# Patient Record
Sex: MALE | Race: WHITE | NOT HISPANIC OR LATINO
[De-identification: names, ages, dates, MRNs, and addresses within clinical notes are randomized per-mention and may not be internally consistent; named-entity substitution may affect disease eponyms.]

---

## 2018-01-11 ENCOUNTER — TRANSCRIPTION ENCOUNTER (OUTPATIENT)
Age: 58
End: 2018-01-11

## 2018-01-11 ENCOUNTER — APPOINTMENT (OUTPATIENT)
Dept: SURGERY | Facility: CLINIC | Age: 58
End: 2018-01-11

## 2018-05-30 ENCOUNTER — APPOINTMENT (OUTPATIENT)
Dept: NEUROSURGERY | Facility: CLINIC | Age: 58
End: 2018-05-30

## 2018-06-26 ENCOUNTER — APPOINTMENT (OUTPATIENT)
Dept: NEUROLOGY | Facility: CLINIC | Age: 58
End: 2018-06-26

## 2018-09-05 ENCOUNTER — APPOINTMENT (OUTPATIENT)
Dept: NEUROSURGERY | Facility: CLINIC | Age: 58
End: 2018-09-05
Payer: COMMERCIAL

## 2018-09-05 VITALS
DIASTOLIC BLOOD PRESSURE: 79 MMHG | HEART RATE: 60 BPM | SYSTOLIC BLOOD PRESSURE: 115 MMHG | BODY MASS INDEX: 35.12 KG/M2 | WEIGHT: 265 LBS | HEIGHT: 73 IN | RESPIRATION RATE: 18 BRPM

## 2018-09-05 PROCEDURE — 99203 OFFICE O/P NEW LOW 30 MIN: CPT

## 2018-10-24 ENCOUNTER — APPOINTMENT (OUTPATIENT)
Dept: NEUROLOGY | Facility: CLINIC | Age: 58
End: 2018-10-24

## 2018-11-21 ENCOUNTER — APPOINTMENT (OUTPATIENT)
Dept: NEUROSURGERY | Facility: CLINIC | Age: 58
End: 2018-11-21
Payer: COMMERCIAL

## 2018-11-21 VITALS
DIASTOLIC BLOOD PRESSURE: 86 MMHG | HEART RATE: 80 BPM | BODY MASS INDEX: 36.05 KG/M2 | RESPIRATION RATE: 17 BRPM | SYSTOLIC BLOOD PRESSURE: 145 MMHG | WEIGHT: 272 LBS | HEIGHT: 73 IN

## 2018-11-21 PROCEDURE — 99213 OFFICE O/P EST LOW 20 MIN: CPT

## 2019-02-11 ENCOUNTER — APPOINTMENT (OUTPATIENT)
Dept: NEUROSURGERY | Facility: CLINIC | Age: 59
End: 2019-02-11

## 2019-03-29 ENCOUNTER — TRANSCRIPTION ENCOUNTER (OUTPATIENT)
Age: 59
End: 2019-03-29

## 2019-04-08 ENCOUNTER — APPOINTMENT (OUTPATIENT)
Dept: CARDIOLOGY | Facility: CLINIC | Age: 59
End: 2019-04-08
Payer: COMMERCIAL

## 2019-04-08 ENCOUNTER — NON-APPOINTMENT (OUTPATIENT)
Age: 59
End: 2019-04-08

## 2019-04-08 VITALS
BODY MASS INDEX: 35.78 KG/M2 | WEIGHT: 270 LBS | OXYGEN SATURATION: 98 % | SYSTOLIC BLOOD PRESSURE: 142 MMHG | DIASTOLIC BLOOD PRESSURE: 84 MMHG | HEART RATE: 55 BPM | HEIGHT: 73 IN

## 2019-04-08 PROCEDURE — 93000 ELECTROCARDIOGRAM COMPLETE: CPT

## 2019-04-08 PROCEDURE — 99205 OFFICE O/P NEW HI 60 MIN: CPT

## 2019-04-08 NOTE — DISCUSSION/SUMMARY
[FreeTextEntry1] : In summary the patient is a 58-year-old gentleman with hypertension, obesity, obstructive sleep apnea and increasing episodes of paroxysmal atrial fibrillation. We discussed the underlying pathophysiology of atrial fibrillation at length. In addition we reviewed the potential treatment options of rate control and anticoagulation, rhythm control utilizing an antiarrhythmic or rhythm control utilizing ablation. We discussed the pros and cons of each approach. In addition we discussed the procedures, risks and outcomes of atrial fibrillation ablation. For now the patient is going to start on flecainide 100 mg twice a day in addition to his atenolol. He is considering ablation and I think he is leaning in this direction. In addition the patient does have a risk worth one for his hypertension. We discussed the pros and cons of anticoagulation. Given the fact that the risk of bleeding on aspirin in somebody like the pain is similar in trials to the risk of bleeding on Eliquis, while aspirin offers no protection from stroke in atrial fibrillation I think anticoagulation is reasonable. The patient does have Eliquis at home and is going to start taking 5 mg twice a day.

## 2019-04-08 NOTE — HISTORY OF PRESENT ILLNESS
[FreeTextEntry1] : I had the pleasure of seeing your patient Curtis Tatum today in the arrhythmia clinic of [facility]. As you well no the patient is a delightful 58-year-old gentleman with a history of hypertension, hyperlipidemia, sleep apnea, obesity and atrial fibrillation. Over the years the patient reported vague episodes of chest pain and occasional dizziness. In addition he had palpitations. He thinks the atrial fibrillation probably started around 2014. He underwent an evaluation at that time and he was under quite a bit of stress. Things settled down and he went for about 2 years without any episodes but they seem to have increased again. Workup in the past included an event recorder without arrhythmia. He was started on low dose atenolol which seemed to reduce his palpitations. The patient had lost weight and in 2014 had come off his medications. He had an acute rise in his blood pressure and what he described as a panic attack with his heart rate going to 140 beats per minute. He went back on atenolol and this terminated the episode acutely. Ultimately he was diagnosed with atrial fibrillation and has been managed with aspirin and beta blocker only. The patient had a stress test and echocardiogram which were unremarkable. He does report he was having increasing episodes of the atrial fibrillation and was on serial increases of the atenolol. He had been prescribed 25 mg twice a day but ultimately on his own was taking 50 in the morning 25 in the middle of the day and 50 at night. He wore a monitor from March 5 through the 13th 2019 which showed a heart rate between 34 and 143 beats per minute with an average of 52. He was in atrial fibrillation 4% of the time with the longest episode being 2 hours and 43 minutes and heart rates ranging from  beats per minute. He also had runs of what appeared to be an atrial tachycardia. He was advised to decrease the atenolol to 50 in the morning and 25 in the evening. \par \par Echocardiogram February 13, 2019: Normal left ventricular size and function with ejection fraction of 60-65%. Left atrial enlargement of 4.7 cm. No significant valvular disease\par \par Chemical stress test March 26th 2019 normal left ventricular size and function with an ejection fraction of 60% and no evidence of ischemia\par \par Today in clinic the patient reports doing overall well. He continues to have breakthrough episodes that are symptomatic. His blood pressure is 142/84 and his pulse was 55 and regular. His EKG demonstrated sinus bradycardia 54 beats per minute.\par

## 2019-04-18 ENCOUNTER — MEDICATION RENEWAL (OUTPATIENT)
Age: 59
End: 2019-04-18

## 2019-04-19 ENCOUNTER — MEDICATION RENEWAL (OUTPATIENT)
Age: 59
End: 2019-04-19

## 2019-04-22 ENCOUNTER — APPOINTMENT (OUTPATIENT)
Dept: ELECTROPHYSIOLOGY | Facility: CLINIC | Age: 59
End: 2019-04-22
Payer: COMMERCIAL

## 2019-04-22 ENCOUNTER — NON-APPOINTMENT (OUTPATIENT)
Age: 59
End: 2019-04-22

## 2019-04-22 VITALS
HEIGHT: 73 IN | DIASTOLIC BLOOD PRESSURE: 78 MMHG | SYSTOLIC BLOOD PRESSURE: 121 MMHG | HEART RATE: 54 BPM | WEIGHT: 270 LBS | BODY MASS INDEX: 35.78 KG/M2 | OXYGEN SATURATION: 98 %

## 2019-04-22 PROCEDURE — 93000 ELECTROCARDIOGRAM COMPLETE: CPT

## 2019-04-22 PROCEDURE — 99214 OFFICE O/P EST MOD 30 MIN: CPT

## 2019-04-22 RX ORDER — FLECAINIDE ACETATE 150 MG/1
150 TABLET ORAL
Qty: 180 | Refills: 1 | Status: DISCONTINUED | COMMUNITY
Start: 2019-04-08 | End: 2019-04-22

## 2019-04-22 RX ORDER — PAROXETINE 25 MG/1
25 TABLET, FILM COATED, EXTENDED RELEASE ORAL DAILY
Refills: 0 | Status: ACTIVE | COMMUNITY
Start: 2019-03-02

## 2019-04-26 ENCOUNTER — MEDICATION RENEWAL (OUTPATIENT)
Age: 59
End: 2019-04-26

## 2019-04-26 NOTE — REASON FOR VISIT
[Atrial Fibrillation] : atrial fibrillation [Follow-Up - Clinic] : a clinic follow-up of [Spouse] : spouse

## 2019-04-26 NOTE — HISTORY OF PRESENT ILLNESS
[FreeTextEntry1] : I had the pleasure of seeing your patient Curtis Tatum today in the arrhythmia clinic of [facility]. As you well no the patient is a delightful 58-year-old gentleman with a history of hypertension, hyperlipidemia, sleep apnea, obesity and atrial fibrillation. Over the years the patient reported vague episodes of chest pain and occasional dizziness. In addition he had palpitations. He thinks the atrial fibrillation probably started around 2014. He underwent an evaluation at that time and he was under quite a bit of stress. Things settled down and he went for about 2 years without any episodes but they seem to have increased again. Workup in the past included an event recorder without arrhythmia. He was started on low dose atenolol which seemed to reduce his palpitations. The patient had lost weight and in 2014 had come off his medications. He had an acute rise in his blood pressure and what he described as a panic attack with his heart rate going to 140 beats per minute. He went back on atenolol and this terminated the episode acutely. Ultimately he was diagnosed with atrial fibrillation and has been managed with aspirin and beta blocker only. The patient had a stress test and echocardiogram which were unremarkable. He does report he was having increasing episodes of the atrial fibrillation and was on serial increases of the atenolol. He had been prescribed 25 mg twice a day but ultimately on his own was taking 50 in the morning 25 in the middle of the day and 50 at night. He wore a monitor from March 5 through the 13th 2019 which showed a heart rate between 34 and 143 beats per minute with an average of 52. He was in atrial fibrillation 4% of the time with the longest episode being 2 hours and 43 minutes and heart rates ranging from  beats per minute. He also had runs of what appeared to be an atrial tachycardia. He was advised to decrease the atenolol to 50 in the morning and 25 in the evening. \par \par Echocardiogram February 13, 2019: Normal left ventricular size and function with ejection fraction of 60-65%. Left atrial enlargement of 4.7 cm. No significant valvular disease\par \par Chemical stress test March 26th 2019 normal left ventricular size and function with an ejection fraction of 60% and no evidence of ischemia\par \par When I last saw him in clinic we discussed the treatment options. We discussed trying flecainide versus catheter ablation. The patient opted for medical approach but did not take the medications initially. He'll do that when he started taking them and there was some initial success but he began having breakthroughs. He therefore increased the dose from 100 mg twice a day to 150 mg twice a day but was still having breakthrough episodes. He comes in to clinic today to again discuss options.\par \par Today in clinic the patient reports having atrial fibrillation on and off almost every day. The flecainide initially seemed to help and was potentially wearing off but he has now had episodes even soon after a dose. We discussed how this is probably not working at this point. His blood pressure today was 121/78 his pulse 54 and regular. His EKG revealed sinus bradycardia 54 beats per minute.\par

## 2019-04-26 NOTE — DISCUSSION/SUMMARY
[FreeTextEntry1] : In summary the patient is a 58-year-old gentleman with symptomatic paroxysmal atrial fibrillation and an anxiety disorder. We again discussed the options and for now the patient wants to take flecainide on a p.r.n. basis however I advised him that given his frequency of atrial fibrillation this is not going to be ultimately a long-term option. We discussed alternative antiarrhythmic medications. We are somewhat limited due to his current antidepressants and antianxiety use which would interact with drugs with significantly prolonged QT such as sotalol or dofetilide. We could try amiodarone. We again discussed ablation and he is going to consider this moving forward.

## 2019-05-13 ENCOUNTER — APPOINTMENT (OUTPATIENT)
Dept: ELECTROPHYSIOLOGY | Facility: CLINIC | Age: 59
End: 2019-05-13
Payer: COMMERCIAL

## 2019-05-13 ENCOUNTER — NON-APPOINTMENT (OUTPATIENT)
Age: 59
End: 2019-05-13

## 2019-05-13 VITALS
WEIGHT: 270 LBS | HEART RATE: 50 BPM | DIASTOLIC BLOOD PRESSURE: 79 MMHG | BODY MASS INDEX: 35.78 KG/M2 | SYSTOLIC BLOOD PRESSURE: 114 MMHG | HEIGHT: 73 IN | OXYGEN SATURATION: 98 %

## 2019-05-13 PROCEDURE — 93000 ELECTROCARDIOGRAM COMPLETE: CPT

## 2019-05-13 PROCEDURE — 99214 OFFICE O/P EST MOD 30 MIN: CPT

## 2019-05-16 NOTE — DISCUSSION/SUMMARY
[FreeTextEntry1] : In summary the patient is an anxious 58-year-old gentleman with symptomatic paroxysmal atrial fibrillation and an anxiety disorder. We again discussed the options and he will decrease the amiodarone to 200mg and plan for an ablation in June.

## 2019-05-16 NOTE — HISTORY OF PRESENT ILLNESS
[FreeTextEntry1] : I had the pleasure of seeing your patient Curtis Tatum today in the arrhythmia clinic of [facility]. As you well no the patient is a delightful but anxious 58-year-old gentleman with a history of hypertension, hyperlipidemia, sleep apnea, obesity and atrial fibrillation. Over the years the patient reported vague episodes of chest pain and occasional dizziness. In addition he had palpitations. He thinks the atrial fibrillation probably started around 2014. He underwent an evaluation at that time and he was under quite a bit of stress. Things settled down and he went for about 2 years without any episodes but they seem to have increased again. Workup in the past included an event recorder without arrhythmia. He was started on low dose atenolol which seemed to reduce his palpitations. The patient had lost weight and in 2014 had come off his medications. He had an acute rise in his blood pressure and what he described as a panic attack with his heart rate going to 140 beats per minute. He went back on atenolol and this terminated the episode acutely. Ultimately he was diagnosed with atrial fibrillation and has been managed with aspirin and beta blocker only. The patient had a stress test and echocardiogram which were unremarkable. He does report he was having increasing episodes of the atrial fibrillation and was on serial increases of the atenolol. He had been prescribed 25 mg twice a day but ultimately on his own was taking 50 in the morning 25 in the middle of the day and 50 at night. He wore a monitor from March 5 through the 13th 2019 which showed a heart rate between 34 and 143 beats per minute with an average of 52. He was in atrial fibrillation 4% of the time with the longest episode being 2 hours and 43 minutes and heart rates ranging from  beats per minute. He also had runs of what appeared to be an atrial tachycardia. He was advised to decrease the atenolol to 50 in the morning and 25 in the evening. \par \par Echocardiogram February 13, 2019: Normal left ventricular size and function with ejection fraction of 60-65%. Left atrial enlargement of 4.7 cm. No significant valvular disease\par \par A chemical stress test March 26th 2019 normal left ventricular size and function with an ejection fraction of 60% and no evidence of ischemia\par \par When I first saw him in clinic we discussed the treatment options. We discussed trying flecainide versus catheter ablation. The patient opted for medical approach but did not take the medications initially. He'll do that when he started taking them and there was some initial success but he began having breakthroughs. He therefore increased the dose from 100 mg twice a day to 150 mg twice a day but was still having breakthrough episodes. We switched him to amiodarone to try to settle things down until he has the ablation.  He comes in to clinic today to again discuss how he is doing.\par \par The patient acknowledges that he is quite anxious. He thinks he is having side effects again and complains of some shortness of breath stomach pain and dizziness. I advised him that he could back off on the amiodarone to 200 mg a day at this point. His blood pressure today in clinic was 114/79 his pulse was 50 and regular. His EKG revealed sinus rhythm at 50 beats per minute with low voltage.\par \par \par

## 2019-05-29 ENCOUNTER — APPOINTMENT (OUTPATIENT)
Dept: CV DIAGNOSITCS | Facility: HOSPITAL | Age: 59
End: 2019-05-29

## 2019-06-04 ENCOUNTER — APPOINTMENT (OUTPATIENT)
Dept: CV DIAGNOSITCS | Facility: HOSPITAL | Age: 59
End: 2019-06-04

## 2019-06-05 ENCOUNTER — APPOINTMENT (OUTPATIENT)
Dept: CV DIAGNOSITCS | Facility: HOSPITAL | Age: 59
End: 2019-06-05

## 2019-06-18 ENCOUNTER — MEDICATION RENEWAL (OUTPATIENT)
Age: 59
End: 2019-06-18

## 2019-06-20 ENCOUNTER — MEDICATION RENEWAL (OUTPATIENT)
Age: 59
End: 2019-06-20

## 2019-06-20 RX ORDER — AMIODARONE HYDROCHLORIDE 200 MG/1
200 TABLET ORAL
Qty: 137 | Refills: 0 | Status: DISCONTINUED | COMMUNITY
Start: 2019-04-26 | End: 2019-06-20

## 2019-06-21 ENCOUNTER — RX RENEWAL (OUTPATIENT)
Age: 59
End: 2019-06-21

## 2019-07-01 ENCOUNTER — APPOINTMENT (OUTPATIENT)
Dept: CARDIOLOGY | Facility: CLINIC | Age: 59
End: 2019-07-01
Payer: COMMERCIAL

## 2019-07-01 ENCOUNTER — NON-APPOINTMENT (OUTPATIENT)
Age: 59
End: 2019-07-01

## 2019-07-01 VITALS
WEIGHT: 270 LBS | OXYGEN SATURATION: 97 % | SYSTOLIC BLOOD PRESSURE: 123 MMHG | HEART RATE: 49 BPM | BODY MASS INDEX: 35.78 KG/M2 | HEIGHT: 73 IN | DIASTOLIC BLOOD PRESSURE: 85 MMHG

## 2019-07-01 PROCEDURE — 93000 ELECTROCARDIOGRAM COMPLETE: CPT

## 2019-07-01 PROCEDURE — 99213 OFFICE O/P EST LOW 20 MIN: CPT

## 2019-07-05 NOTE — DISCUSSION/SUMMARY
[FreeTextEntry1] : In summary the patient is an anxious 58-year-old gentleman with symptomatic paroxysmal atrial fibrillation despite antiarrhythmic therapy. The patient was coming in to again review what to expect with his ablation. We discussed the procedure, outcomes and risks at length the again. The patient is comfortable and he is scheduled for this procedure in the next couple of weeks.

## 2019-07-05 NOTE — HISTORY OF PRESENT ILLNESS
[FreeTextEntry1] : I had the pleasure of seeing your patient Curtis Tatum today in the arrhythmia clinic of [facility]. As you well no the patient is a delightful but anxious 58-year-old gentleman with a history of hypertension, hyperlipidemia, sleep apnea, obesity and atrial fibrillation. Over the years the patient reported vague episodes of chest pain and occasional dizziness. In addition he had palpitations. He thinks the atrial fibrillation probably started around 2014. He underwent an evaluation at that time and he was under quite a bit of stress. Things settled down and he went for about 2 years without any episodes but they seem to have increased again. Workup in the past included an event recorder without arrhythmia. He was started on low dose atenolol which seemed to reduce his palpitations. The patient had lost weight and in 2014 had come off his medications. He had an acute rise in his blood pressure and what he described as a panic attack with his heart rate going to 140 beats per minute. He went back on atenolol and this terminated the episode acutely. Ultimately he was diagnosed with atrial fibrillation and has been managed with aspirin and beta blocker only. The patient had a stress test and echocardiogram which were unremarkable. He does report he was having increasing episodes of the atrial fibrillation and was on serial increases of the atenolol. He had been prescribed 25 mg twice a day but ultimately on his own was taking 50 in the morning 25 in the middle of the day and 50 at night. He wore a monitor from March 5 through the 13th 2019 which showed a heart rate between 34 and 143 beats per minute with an average of 52. He was in atrial fibrillation 4% of the time with the longest episode being 2 hours and 43 minutes and heart rates ranging from  beats per minute. He also had runs of what appeared to be an atrial tachycardia. He was advised to decrease the atenolol to 50 in the morning and 25 in the evening. \par \par Echocardiogram February 13, 2019: Normal left ventricular size and function with ejection fraction of 60-65%. Left atrial enlargement of 4.7 cm. No significant valvular disease\par \par A chemical stress test March 26th 2019 normal left ventricular size and function with an ejection fraction of 60% and no evidence of ischemia\par \par When I first saw him in clinic we discussed the treatment options. We discussed trying flecainide versus catheter ablation. The patient opted for medical approach but did not take the medications initially. He'll do that when he started taking them and there was some initial success but he began having breakthroughs. He therefore increased the dose from 100 mg twice a day to 150 mg twice a day but was still having breakthrough episodes. We switched him to amiodarone to try to settle things down until he has the ablation. \par \par The patient did not feel that the amiodarone was helping and switched himself back to flecainide. He is currently taking 50 mg twice a day, he is on anticoagulation and takes anywhere from 25-37.5 mg of atenolol a day. The patient has opted for an ablative approach but wanted to again come in and discussed his decision given his underlying anxiety.\par \par Today in clinic the patient is doing fairly well. He has had decreased episodes on his current regimen but still has breakthroughs. His blood pressure was 123/85 and his pulse was 49 and regular. His EKG revealed sinus rhythm at 50 beats per minute and was otherwise normal.

## 2019-07-09 ENCOUNTER — APPOINTMENT (OUTPATIENT)
Dept: CV DIAGNOSITCS | Facility: HOSPITAL | Age: 59
End: 2019-07-09

## 2019-07-17 ENCOUNTER — OUTPATIENT (OUTPATIENT)
Dept: OUTPATIENT SERVICES | Facility: HOSPITAL | Age: 59
LOS: 1 days | End: 2019-07-17
Payer: COMMERCIAL

## 2019-07-17 VITALS
HEIGHT: 73 IN | OXYGEN SATURATION: 97 % | RESPIRATION RATE: 16 BRPM | WEIGHT: 276.9 LBS | DIASTOLIC BLOOD PRESSURE: 77 MMHG | SYSTOLIC BLOOD PRESSURE: 125 MMHG | TEMPERATURE: 99 F | HEART RATE: 49 BPM

## 2019-07-17 VITALS
WEIGHT: 276.9 LBS | OXYGEN SATURATION: 97 % | HEIGHT: 73 IN | TEMPERATURE: 99 F | HEART RATE: 49 BPM | SYSTOLIC BLOOD PRESSURE: 125 MMHG | DIASTOLIC BLOOD PRESSURE: 77 MMHG | RESPIRATION RATE: 16 BRPM

## 2019-07-17 DIAGNOSIS — Z01.818 ENCOUNTER FOR OTHER PREPROCEDURAL EXAMINATION: ICD-10-CM

## 2019-07-17 DIAGNOSIS — I48.91 UNSPECIFIED ATRIAL FIBRILLATION: ICD-10-CM

## 2019-07-17 LAB
ALBUMIN SERPL ELPH-MCNC: 4.3 G/DL — SIGNIFICANT CHANGE UP (ref 3.3–5)
ALP SERPL-CCNC: 51 U/L — SIGNIFICANT CHANGE UP (ref 40–120)
ALT FLD-CCNC: 16 U/L — SIGNIFICANT CHANGE UP (ref 10–45)
ANION GAP SERPL CALC-SCNC: 11 MMOL/L — SIGNIFICANT CHANGE UP (ref 5–17)
APTT BLD: 34.8 SEC — SIGNIFICANT CHANGE UP (ref 27.5–36.3)
AST SERPL-CCNC: 20 U/L — SIGNIFICANT CHANGE UP (ref 10–40)
BILIRUB SERPL-MCNC: 0.5 MG/DL — SIGNIFICANT CHANGE UP (ref 0.2–1.2)
BLD GP AB SCN SERPL QL: NEGATIVE — SIGNIFICANT CHANGE UP
BUN SERPL-MCNC: 17 MG/DL — SIGNIFICANT CHANGE UP (ref 7–23)
CALCIUM SERPL-MCNC: 9.1 MG/DL — SIGNIFICANT CHANGE UP (ref 8.4–10.5)
CHLORIDE SERPL-SCNC: 100 MMOL/L — SIGNIFICANT CHANGE UP (ref 96–108)
CO2 SERPL-SCNC: 27 MMOL/L — SIGNIFICANT CHANGE UP (ref 22–31)
CREAT SERPL-MCNC: 0.9 MG/DL — SIGNIFICANT CHANGE UP (ref 0.5–1.3)
GLUCOSE SERPL-MCNC: 105 MG/DL — HIGH (ref 70–99)
HCT VFR BLD CALC: 36.7 % — LOW (ref 39–50)
HGB BLD-MCNC: 12.6 G/DL — LOW (ref 13–17)
INR BLD: 1.01 RATIO — SIGNIFICANT CHANGE UP (ref 0.88–1.16)
MCHC RBC-ENTMCNC: 30.4 PG — SIGNIFICANT CHANGE UP (ref 27–34)
MCHC RBC-ENTMCNC: 34.5 GM/DL — SIGNIFICANT CHANGE UP (ref 32–36)
MCV RBC AUTO: 88 FL — SIGNIFICANT CHANGE UP (ref 80–100)
PLATELET # BLD AUTO: 194 K/UL — SIGNIFICANT CHANGE UP (ref 150–400)
POTASSIUM SERPL-MCNC: 4.5 MMOL/L — SIGNIFICANT CHANGE UP (ref 3.5–5.3)
POTASSIUM SERPL-SCNC: 4.5 MMOL/L — SIGNIFICANT CHANGE UP (ref 3.5–5.3)
PROT SERPL-MCNC: 6.8 G/DL — SIGNIFICANT CHANGE UP (ref 6–8.3)
PROTHROM AB SERPL-ACNC: 11.5 SEC — SIGNIFICANT CHANGE UP (ref 10–12.9)
RBC # BLD: 4.17 M/UL — LOW (ref 4.2–5.8)
RBC # FLD: 12.5 % — SIGNIFICANT CHANGE UP (ref 10.3–14.5)
RH IG SCN BLD-IMP: POSITIVE — SIGNIFICANT CHANGE UP
SODIUM SERPL-SCNC: 138 MMOL/L — SIGNIFICANT CHANGE UP (ref 135–145)
WBC # BLD: 8 K/UL — SIGNIFICANT CHANGE UP (ref 3.8–10.5)
WBC # FLD AUTO: 8 K/UL — SIGNIFICANT CHANGE UP (ref 3.8–10.5)

## 2019-07-17 PROCEDURE — 93005 ELECTROCARDIOGRAM TRACING: CPT

## 2019-07-17 PROCEDURE — G0463: CPT

## 2019-07-17 PROCEDURE — 85027 COMPLETE CBC AUTOMATED: CPT

## 2019-07-17 PROCEDURE — 86850 RBC ANTIBODY SCREEN: CPT

## 2019-07-17 PROCEDURE — 80053 COMPREHEN METABOLIC PANEL: CPT

## 2019-07-17 PROCEDURE — 85730 THROMBOPLASTIN TIME PARTIAL: CPT

## 2019-07-17 PROCEDURE — 86900 BLOOD TYPING SEROLOGIC ABO: CPT

## 2019-07-17 PROCEDURE — 85610 PROTHROMBIN TIME: CPT

## 2019-07-17 PROCEDURE — 86901 BLOOD TYPING SEROLOGIC RH(D): CPT

## 2019-07-17 PROCEDURE — 93010 ELECTROCARDIOGRAM REPORT: CPT

## 2019-07-17 RX ORDER — ESOMEPRAZOLE MAGNESIUM 40 MG/1
1 CAPSULE, DELAYED RELEASE ORAL
Qty: 0 | Refills: 0 | DISCHARGE

## 2019-07-17 RX ORDER — CLONAZEPAM 1 MG
1 TABLET ORAL
Qty: 0 | Refills: 0 | DISCHARGE

## 2019-07-17 RX ORDER — POLYETHYLENE GLYCOL 3350 17 G/17G
1 POWDER, FOR SOLUTION ORAL
Qty: 0 | Refills: 0 | DISCHARGE

## 2019-07-17 RX ORDER — GABAPENTIN 400 MG/1
1 CAPSULE ORAL
Qty: 0 | Refills: 0 | DISCHARGE

## 2019-07-17 RX ORDER — LORATADINE 10 MG/1
1 TABLET ORAL
Qty: 0 | Refills: 0 | DISCHARGE

## 2019-07-17 RX ORDER — DOCUSATE SODIUM 100 MG
3 CAPSULE ORAL
Qty: 0 | Refills: 0 | DISCHARGE

## 2019-07-17 RX ORDER — APIXABAN 2.5 MG/1
5 TABLET, FILM COATED ORAL
Qty: 0 | Refills: 0 | DISCHARGE

## 2019-07-17 RX ORDER — RANITIDINE HYDROCHLORIDE 150 MG/1
150 TABLET, FILM COATED ORAL
Qty: 0 | Refills: 0 | DISCHARGE

## 2019-07-17 RX ORDER — SUCRALFATE 1 G
10 TABLET ORAL
Qty: 0 | Refills: 0 | DISCHARGE

## 2019-07-17 RX ORDER — ATENOLOL 25 MG/1
1 TABLET ORAL
Qty: 0 | Refills: 0 | DISCHARGE

## 2019-07-17 NOTE — H&P CARDIOLOGY - PMH
Acid reflux    Afib    Anxiety    Herniated disc, cervical    IBS (irritable bowel syndrome)    Neuropathy    BRADEN on CPAP

## 2019-07-17 NOTE — H&P CARDIOLOGY - HISTORY OF PRESENT ILLNESS
Patient is 58 years old  male with PMHx HTN, HLD, anxiety, acid reflux, IBS, BRADEN on CPAP, herniated disc, neuropathy, pAfib for 5 years, on Eliquis who presents for PST for planned elective Afib ablation scheduled on 7/18/19 with Dr Storm.  Denied chest discomfort, dizziness, diaphoresis, palpitations, nausea, vomiting, peripheral edema, recent weight gain, or syncope.     pcp Dr Stephon Curran  cardiology Dr Sameer Arguelles   GI Dr Oj Campo

## 2019-07-17 NOTE — PRE-ANESTHESIA EVALUATION ADULT - NSANTHPMHFT_GEN_ALL_CORE
58 years old  male with PMHx HTN, HLD, anxiety, acid reflux, IBS, BRADEN on CPAP, herniated disc, neuropathy, pAfib for 5 years, on Eliquis 58 years old  male with PMHx HTN, HLD, anxiety, acid reflux on meds moderately well controlled, still reflux sometimes on empty stomach, IBS, BRADEN on CPAP, herniated disc, neuropathy, pAfib for 5 years, on Eliquis

## 2019-07-17 NOTE — H&P CARDIOLOGY - FAMILY HISTORY
Father  Still living? No  MI (myocardial infarction), Age at diagnosis: Age Unknown  Family history of prostate cancer in father, Age at diagnosis: Age Unknown     Mother  Still living? No  Family history of pacemaker, Age at diagnosis: Age Unknown

## 2019-07-18 ENCOUNTER — OUTPATIENT (OUTPATIENT)
Dept: INPATIENT UNIT | Facility: HOSPITAL | Age: 59
LOS: 1 days | End: 2019-07-18
Payer: COMMERCIAL

## 2019-07-18 ENCOUNTER — APPOINTMENT (OUTPATIENT)
Dept: CV DIAGNOSITCS | Facility: HOSPITAL | Age: 59
End: 2019-07-18

## 2019-07-18 ENCOUNTER — TRANSCRIPTION ENCOUNTER (OUTPATIENT)
Age: 59
End: 2019-07-18

## 2019-07-18 DIAGNOSIS — I48.91 UNSPECIFIED ATRIAL FIBRILLATION: ICD-10-CM

## 2019-07-18 LAB
ALBUMIN SERPL ELPH-MCNC: 4 G/DL — SIGNIFICANT CHANGE UP (ref 3.3–5)
ALP SERPL-CCNC: 47 U/L — SIGNIFICANT CHANGE UP (ref 40–120)
ALT FLD-CCNC: 18 U/L — SIGNIFICANT CHANGE UP (ref 10–45)
ANION GAP SERPL CALC-SCNC: 12 MMOL/L — SIGNIFICANT CHANGE UP (ref 5–17)
APTT BLD: >200 SEC — CRITICAL HIGH (ref 27.5–36.3)
AST SERPL-CCNC: 51 U/L — HIGH (ref 10–40)
BASOPHILS # BLD AUTO: 0 K/UL — SIGNIFICANT CHANGE UP (ref 0–0.2)
BASOPHILS NFR BLD AUTO: 0.1 % — SIGNIFICANT CHANGE UP (ref 0–2)
BILIRUB SERPL-MCNC: 0.5 MG/DL — SIGNIFICANT CHANGE UP (ref 0.2–1.2)
BLD GP AB SCN SERPL QL: NEGATIVE — SIGNIFICANT CHANGE UP
BUN SERPL-MCNC: 14 MG/DL — SIGNIFICANT CHANGE UP (ref 7–23)
CALCIUM SERPL-MCNC: 8.7 MG/DL — SIGNIFICANT CHANGE UP (ref 8.4–10.5)
CHLORIDE SERPL-SCNC: 108 MMOL/L — SIGNIFICANT CHANGE UP (ref 96–108)
CO2 SERPL-SCNC: 23 MMOL/L — SIGNIFICANT CHANGE UP (ref 22–31)
CREAT SERPL-MCNC: 0.82 MG/DL — SIGNIFICANT CHANGE UP (ref 0.5–1.3)
EOSINOPHIL # BLD AUTO: 0 K/UL — SIGNIFICANT CHANGE UP (ref 0–0.5)
EOSINOPHIL NFR BLD AUTO: 0.2 % — SIGNIFICANT CHANGE UP (ref 0–6)
GLUCOSE SERPL-MCNC: 127 MG/DL — HIGH (ref 70–99)
HCT VFR BLD CALC: 37.9 % — LOW (ref 39–50)
HGB BLD-MCNC: 13.1 G/DL — SIGNIFICANT CHANGE UP (ref 13–17)
INR BLD: 1.16 RATIO — SIGNIFICANT CHANGE UP (ref 0.88–1.16)
LYMPHOCYTES # BLD AUTO: 1.2 K/UL — SIGNIFICANT CHANGE UP (ref 1–3.3)
LYMPHOCYTES # BLD AUTO: 9.4 % — LOW (ref 13–44)
MAGNESIUM SERPL-MCNC: 2.1 MG/DL — SIGNIFICANT CHANGE UP (ref 1.6–2.6)
MCHC RBC-ENTMCNC: 30.4 PG — SIGNIFICANT CHANGE UP (ref 27–34)
MCHC RBC-ENTMCNC: 34.5 GM/DL — SIGNIFICANT CHANGE UP (ref 32–36)
MCV RBC AUTO: 88.1 FL — SIGNIFICANT CHANGE UP (ref 80–100)
MONOCYTES # BLD AUTO: 0.4 K/UL — SIGNIFICANT CHANGE UP (ref 0–0.9)
MONOCYTES NFR BLD AUTO: 3.2 % — SIGNIFICANT CHANGE UP (ref 2–14)
NEUTROPHILS # BLD AUTO: 11.3 K/UL — HIGH (ref 1.8–7.4)
NEUTROPHILS NFR BLD AUTO: 87.2 % — HIGH (ref 43–77)
PHOSPHATE SERPL-MCNC: 3.2 MG/DL — SIGNIFICANT CHANGE UP (ref 2.5–4.5)
PLATELET # BLD AUTO: 209 K/UL — SIGNIFICANT CHANGE UP (ref 150–400)
POTASSIUM SERPL-MCNC: 4.2 MMOL/L — SIGNIFICANT CHANGE UP (ref 3.5–5.3)
POTASSIUM SERPL-SCNC: 4.2 MMOL/L — SIGNIFICANT CHANGE UP (ref 3.5–5.3)
PROT SERPL-MCNC: 6.7 G/DL — SIGNIFICANT CHANGE UP (ref 6–8.3)
PROTHROM AB SERPL-ACNC: 13.3 SEC — HIGH (ref 10–12.9)
RBC # BLD: 4.3 M/UL — SIGNIFICANT CHANGE UP (ref 4.2–5.8)
RBC # FLD: 12.6 % — SIGNIFICANT CHANGE UP (ref 10.3–14.5)
RH IG SCN BLD-IMP: POSITIVE — SIGNIFICANT CHANGE UP
SODIUM SERPL-SCNC: 143 MMOL/L — SIGNIFICANT CHANGE UP (ref 135–145)
WBC # BLD: 13 K/UL — HIGH (ref 3.8–10.5)
WBC # FLD AUTO: 13 K/UL — HIGH (ref 3.8–10.5)

## 2019-07-18 PROCEDURE — 93010 ELECTROCARDIOGRAM REPORT: CPT

## 2019-07-18 RX ORDER — GABAPENTIN 400 MG/1
300 CAPSULE ORAL
Refills: 0 | Status: DISCONTINUED | OUTPATIENT
Start: 2019-07-18 | End: 2019-08-05

## 2019-07-18 RX ORDER — ACETAMINOPHEN 500 MG
1000 TABLET ORAL ONCE
Refills: 0 | Status: COMPLETED | OUTPATIENT
Start: 2019-07-18 | End: 2019-07-18

## 2019-07-18 RX ORDER — CLONAZEPAM 1 MG
0.12 TABLET ORAL DAILY
Refills: 0 | Status: DISCONTINUED | OUTPATIENT
Start: 2019-07-18 | End: 2019-07-18

## 2019-07-18 RX ORDER — SIMVASTATIN 20 MG/1
20 TABLET, FILM COATED ORAL AT BEDTIME
Refills: 0 | Status: DISCONTINUED | OUTPATIENT
Start: 2019-07-18 | End: 2019-08-05

## 2019-07-18 RX ORDER — FLUTICASONE PROPIONATE 50 MCG
1 SPRAY, SUSPENSION NASAL DAILY
Refills: 0 | Status: DISCONTINUED | OUTPATIENT
Start: 2019-07-18 | End: 2019-08-05

## 2019-07-18 RX ORDER — FLECAINIDE ACETATE 50 MG
50 TABLET ORAL EVERY 12 HOURS
Refills: 0 | Status: DISCONTINUED | OUTPATIENT
Start: 2019-07-18 | End: 2019-07-19

## 2019-07-18 RX ORDER — SUCRALFATE 1 G
1 TABLET ORAL
Refills: 0 | Status: DISCONTINUED | OUTPATIENT
Start: 2019-07-18 | End: 2019-08-05

## 2019-07-18 RX ORDER — CHLORHEXIDINE GLUCONATE 213 G/1000ML
1 SOLUTION TOPICAL
Refills: 0 | Status: DISCONTINUED | OUTPATIENT
Start: 2019-07-18 | End: 2019-08-05

## 2019-07-18 RX ORDER — FAMOTIDINE 10 MG/ML
150 INJECTION INTRAVENOUS
Refills: 0 | Status: DISCONTINUED | OUTPATIENT
Start: 2019-07-18 | End: 2019-07-18

## 2019-07-18 RX ORDER — CLONAZEPAM 1 MG
0.12 TABLET ORAL
Refills: 0 | Status: COMPLETED | OUTPATIENT
Start: 2019-07-18 | End: 2019-07-25

## 2019-07-18 RX ORDER — GABAPENTIN 400 MG/1
300 CAPSULE ORAL ONCE
Refills: 0 | Status: COMPLETED | OUTPATIENT
Start: 2019-07-18 | End: 2019-07-18

## 2019-07-18 RX ORDER — CLONAZEPAM 1 MG
1 TABLET ORAL AT BEDTIME
Refills: 0 | Status: DISCONTINUED | OUTPATIENT
Start: 2019-07-18 | End: 2019-07-18

## 2019-07-18 RX ORDER — APIXABAN 2.5 MG/1
5 TABLET, FILM COATED ORAL
Refills: 0 | Status: DISCONTINUED | OUTPATIENT
Start: 2019-07-18 | End: 2019-08-05

## 2019-07-18 RX ORDER — CLONAZEPAM 1 MG
0.25 TABLET ORAL
Refills: 0 | Status: COMPLETED | OUTPATIENT
Start: 2019-07-18 | End: 2019-07-25

## 2019-07-18 RX ORDER — BENZOCAINE AND MENTHOL 5; 1 G/100ML; G/100ML
1 LIQUID ORAL ONCE
Refills: 0 | Status: COMPLETED | OUTPATIENT
Start: 2019-07-18 | End: 2019-07-18

## 2019-07-18 RX ORDER — LORATADINE 10 MG/1
10 TABLET ORAL DAILY
Refills: 0 | Status: DISCONTINUED | OUTPATIENT
Start: 2019-07-18 | End: 2019-08-05

## 2019-07-18 RX ORDER — DOCUSATE SODIUM 100 MG
100 CAPSULE ORAL THREE TIMES A DAY
Refills: 0 | Status: DISCONTINUED | OUTPATIENT
Start: 2019-07-18 | End: 2019-08-05

## 2019-07-18 RX ORDER — ATENOLOL 25 MG/1
25 TABLET ORAL
Refills: 0 | Status: DISCONTINUED | OUTPATIENT
Start: 2019-07-18 | End: 2019-08-05

## 2019-07-18 RX ORDER — PANTOPRAZOLE SODIUM 20 MG/1
40 TABLET, DELAYED RELEASE ORAL
Refills: 0 | Status: DISCONTINUED | OUTPATIENT
Start: 2019-07-18 | End: 2019-08-05

## 2019-07-18 RX ORDER — POLYETHYLENE GLYCOL 3350 17 G/17G
17 POWDER, FOR SOLUTION ORAL
Refills: 0 | Status: DISCONTINUED | OUTPATIENT
Start: 2019-07-18 | End: 2019-08-05

## 2019-07-18 RX ORDER — CLONAZEPAM 1 MG
0.5 TABLET ORAL AT BEDTIME
Refills: 0 | Status: COMPLETED | OUTPATIENT
Start: 2019-07-18 | End: 2019-07-25

## 2019-07-18 RX ORDER — FAMOTIDINE 10 MG/ML
40 INJECTION INTRAVENOUS
Refills: 0 | Status: DISCONTINUED | OUTPATIENT
Start: 2019-07-18 | End: 2019-08-05

## 2019-07-18 RX ADMIN — Medication 100 MILLIGRAM(S): at 16:03

## 2019-07-18 RX ADMIN — POLYETHYLENE GLYCOL 3350 17 GRAM(S): 17 POWDER, FOR SOLUTION ORAL at 17:52

## 2019-07-18 RX ADMIN — Medication 0.12 MILLIGRAM(S): at 16:29

## 2019-07-18 RX ADMIN — ATENOLOL 25 MILLIGRAM(S): 25 TABLET ORAL at 17:52

## 2019-07-18 RX ADMIN — BENZOCAINE AND MENTHOL 1 LOZENGE: 5; 1 LIQUID ORAL at 20:04

## 2019-07-18 RX ADMIN — Medication 1000 MILLIGRAM(S): at 18:48

## 2019-07-18 RX ADMIN — Medication 1000 MILLIGRAM(S): at 23:45

## 2019-07-18 RX ADMIN — Medication 50 MILLIGRAM(S): at 18:17

## 2019-07-18 RX ADMIN — Medication 0.5 MILLIGRAM(S): at 23:30

## 2019-07-18 RX ADMIN — SIMVASTATIN 20 MILLIGRAM(S): 20 TABLET, FILM COATED ORAL at 21:04

## 2019-07-18 RX ADMIN — Medication 1 GRAM(S): at 16:03

## 2019-07-18 RX ADMIN — Medication 100 MILLIGRAM(S): at 21:04

## 2019-07-18 RX ADMIN — Medication 400 MILLIGRAM(S): at 23:30

## 2019-07-18 RX ADMIN — LORATADINE 10 MILLIGRAM(S): 10 TABLET ORAL at 16:03

## 2019-07-18 RX ADMIN — GABAPENTIN 300 MILLIGRAM(S): 400 CAPSULE ORAL at 17:52

## 2019-07-18 RX ADMIN — PANTOPRAZOLE SODIUM 40 MILLIGRAM(S): 20 TABLET, DELAYED RELEASE ORAL at 17:52

## 2019-07-18 RX ADMIN — APIXABAN 5 MILLIGRAM(S): 2.5 TABLET, FILM COATED ORAL at 17:52

## 2019-07-18 RX ADMIN — Medication 1 SPRAY(S): at 18:17

## 2019-07-18 RX ADMIN — Medication 400 MILLIGRAM(S): at 18:17

## 2019-07-18 RX ADMIN — Medication 20 MILLIGRAM(S): at 18:17

## 2019-07-18 NOTE — DISCHARGE NOTE PROVIDER - CARE PROVIDER_API CALL
Gosia Storm (MD)  Cardiac Electrophysiology; Cardiovascular Disease; Internal Medicine  [address] [place] [address]  Phone: [phone]  Fax: [phone]  Follow Up Time:

## 2019-07-18 NOTE — DISCHARGE NOTE PROVIDER - NSDCFUADDINST_GEN_ALL_CORE_FT
It is important to continue your normal daily activities and to continue to walk as much as possible; with periods of rest when necessary.  Do not perform any strenuous activity or heavy lifting until cleared by Dr. Storm. Your heart muscle is currently recovering and you should not be exerting it.  In addition there is no bathing in a bathtub, swimming, or submerging you in water until cleared by Dr. Storm. You have incisions that need to heal and bathing/swimming can expose it to bacteria.  No creams to your incisions. You may remove your dressings when you get home. You may shower. Allow soap and water to run over your incision and pat area dry. Ensure that your groin incisions remain dry at all times to prevent risk of infection.  Follow up with Dr. Storm in 2-3 weeks. Make an appointment within two weeks. Follow up with your primary cardiologist as well.   You are starting Eliquis. Eliquis is a blood thinner and therefore you are at higher risk of bleeding. If you experience any signs of atrial fibrillation such as dizziness, fatigue, palpitations, or fainting please inform Dr. Storm as soon as possible or go to your nearest emergency room.  If you experience any signs of bleeding such as bleeding gums, bloody sputum, bleeding in your stool or black stool inform your primary care doctor.

## 2019-07-18 NOTE — DISCHARGE NOTE PROVIDER - NSDCCPCAREPLAN_GEN_ALL_CORE_FT
PRINCIPAL DISCHARGE DIAGNOSIS  Diagnosis: Atrial fibrillation  Assessment and Plan of Treatment: Atrial fibrillation is the most common heart rhythm problem.  The condition puts you at risk for has stroke and heart attack. It helps if you control your blood pressure, not drink more than 1-2 alcohol drinks per day, cut down on caffeine, getting treatment for over active thyroid gland, and get regular exercise.  Call your doctor if you feel your heart racing or beating unusually, chest tightness or pain, lightheaded, faint, shortness of breath especially with exercise. It is important to take your heart medication as prescribed. You may be on anticoagulation which is very important to take as directed - you may need blood work to monitor drug levels.      SECONDARY DISCHARGE DIAGNOSES  Diagnosis: Hypertension  Assessment and Plan of Treatment: Low salt diet. Activity as tolerated. Take all medication as prescribed. Follow up with your medical doctor for routine blood pressure monitoring at your next visit. Notify your doctor if you have any of the following symptoms: Dizziness, Lightheadedness, Blurry vision, Headache, Chest pain, Shortness of breath PRINCIPAL DISCHARGE DIAGNOSIS  Diagnosis: Atrial fibrillation  Assessment and Plan of Treatment: Atrial fibrillation is the most common heart rhythm problem.  The condition puts you at risk for has stroke and heart attack. It helps if you control your blood pressure, not drink more than 1-2 alcohol drinks per day, cut down on caffeine, getting treatment for over active thyroid gland, and get regular exercise.  Call your doctor if you feel your heart racing or beating unusually, chest tightness or pain, lightheaded, faint, shortness of breath especially with exercise. It is important to take your heart medication as prescribed. You may be on anticoagulation which is very important to take as directed - you may need blood work to monitor drug levels.  continue Apixaban 5mg 2x/day  continue you Flecanide at 75mg 2x/day  continue Atenolol 25mg 2x/day  continue your home dose nexium  No strenuous activity for 1-2 weeks  mechanical soft diet for 1 month      SECONDARY DISCHARGE DIAGNOSES  Diagnosis: Hypertension  Assessment and Plan of Treatment: Low salt diet. Activity as tolerated. Take all medication as prescribed. Follow up with your medical doctor for routine blood pressure monitoring at your next visit. Notify your doctor if you have any of the following symptoms: Dizziness, Lightheadedness, Blurry vision, Headache, Chest pain, Shortness of breath  Continue Atenolol

## 2019-07-18 NOTE — CHART NOTE - NSCHARTNOTEFT_GEN_A_CORE
====================  CCU MIDNIGHT ROUNDS  ====================    SIRISHA JIMENEZ  [id number]    ====================  SUMMARY: HPI:    ====================        ====================  NEW EVENTS:  s/p AFib ablation, in sinus rhythm.   ====================        ====================  VITALS (Last 12 hrs):  ====================    T(C): 36.8 (07-18-19 @ 19:00), Max: 36.8 (07-18-19 @ 19:00)  HR: 71 (07-18-19 @ 21:00) (71 - 87)  BP: 122/67 (07-18-19 @ 21:00) (114/75 - 160/72)  BP(mean): 89 (07-18-19 @ 21:00) (86 - 104)  ABP: 133/65 (07-18-19 @ 15:30) (122/80 - 145/78)  ABP(mean): 87 (07-18-19 @ 15:30) (84 - 105)  RR: 13 (07-18-19 @ 21:00) (12 - 21)  SpO2: 96% (07-18-19 @ 21:00) (95% - 100%)      TELEMETRY: DELGADO        I&O's Summary    18 Jul 2019 07:01  -  18 Jul 2019 21:13  --------------------------------------------------------  IN: 0 mL / OUT: 775 mL / NET: -775 mL        ====================  PLAN:  # Afib  - s/p ablation today, in NSR  - monitor on tele   - Bilateral groin sutures removed; monitor site per protocol  - c/w eliquis, atenolol, and flecainide  - soft diet, PPI  - c/w home meds  - d/c in AM if stable  ====================    HEALTH ISSUES - PROBLEM Dx:      Debra Cardenas, CCU PA #[id number]

## 2019-07-18 NOTE — CHART NOTE - NSCHARTNOTEFT_GEN_A_CORE
CCU Accept Note    Transfer from:     Accepting physician:    HPI: 58 years old  male with PMHx HTN, HLD, anxiety, acid reflux, IBS, BRADEN on CPAP, herniated disc, neuropathy, pAfib for 5 years, on Eliquis who presents for PST for planned elective Afib ablation scheduled on 7/18/19 with Dr Storm.    Vital Signs Last 24 Hrs  T(C): 36.6 (18 Jul 2019 12:45), Max: 37.1 (17 Jul 2019 20:25)  T(F): 97.9 (18 Jul 2019 12:45), Max: 97.9 (18 Jul 2019 12:45)  HR: 74 (18 Jul 2019 16:00) (49 - 87)  BP: 114/75 (18 Jul 2019 16:00) (114/75 - 125/77)  BP(mean): 91 (18 Jul 2019 16:00) (91 - 91)  RR: 17 (18 Jul 2019 16:00) (12 - 21)  SpO2: 97% (18 Jul 2019 16:00) (95% - 99%)  I&O's Summary    18 Jul 2019 07:01  -  18 Jul 2019 16:53  --------------------------------------------------------  IN: 0 mL / OUT: 675 mL / NET: -675 mL      MEDICATIONS  (STANDING):  apixaban 5 milliGRAM(s) Oral two times a day  ATENolol  Tablet 25 milliGRAM(s) Oral two times a day  chlorhexidine 4% Liquid 1 Application(s) Topical <User Schedule>  clonazePAM  Tablet 0.5 milliGRAM(s) Oral at bedtime  clonazePAM  Tablet 0.125 milliGRAM(s) Oral <User Schedule>  clonazePAM  Tablet 0.25 milliGRAM(s) Oral <User Schedule>  docusate sodium 100 milliGRAM(s) Oral three times a day  famotidine    Tablet 40 milliGRAM(s) Oral two times a day  flecainide 50 milliGRAM(s) Oral every 12 hours  fluticasone propionate 50 MICROgram(s)/spray Nasal Spray 1 Spray(s) Both Nostrils daily  gabapentin 300 milliGRAM(s) Oral two times a day  loratadine 10 milliGRAM(s) Oral daily  pantoprazole    Tablet 40 milliGRAM(s) Oral two times a day  PARoxetine 20 milliGRAM(s) Oral daily  polyethylene glycol 3350 17 Gram(s) Oral two times a day  simvastatin 20 milliGRAM(s) Oral at bedtime  sucralfate suspension 1 Gram(s) Oral <User Schedule>    MEDICATIONS  (PRN):      LABS                                            13.1                  Neurophils% (auto):   87.2   (07-18 @ 15:31):    13.0 )-----------(209          Lymphocytes% (auto):  9.4                                           37.9                   Eosinphils% (auto):   0.2      Manual%: Neutrophils x    ; Lymphocytes x    ; Eosinophils x    ; Bands%: x    ; Blasts x                                    143    |  108    |  14                  Calcium: 8.7   / iCa: x      (07-18 @ 15:31)    ----------------------------<  127       Magnesium: 2.1                              4.2     |  23     |  0.82             Phosphorous: 3.2      TPro  6.7    /  Alb  4.0    /  TBili  0.5    /  DBili  x      /  AST  51     /  ALT  18     /  AlkPhos  47     18 Jul 2019 15:31    ( 07-18 @ 15:31 )   PT: 13.3 sec;   INR: 1.16 ratio  aPTT: >200.0 sec      EKG: Normal sinus rhythm      ASSESSMENT & PLAN: 58 years old  male with PMHx HTN, HLD, anxiety, acid reflux, IBS, BRADEN on CPAP, herniated disc, neuropathy, pAfib for 5 years, on Eliquis. S/P A. fib ablation    PLAN:  continue flecainide and atenolol  re-start apixaban at 5pm  dc stitches at 7pm    Shruti Pal, -ACNP  [id number]

## 2019-07-18 NOTE — DISCHARGE NOTE PROVIDER - HOSPITAL COURSE
59 yo M PMH HTN, HLD, anxiety, acid reflux, IBS, BRADEN on CPAP, herniated disc, neuropathy, pAfib x 5 yrs, on Eliquis presents for AF ablation s/p procedure on 7/18. He was monitored in CCU2 overnight. Hospital course was uneventful. Procedure access was the bilateral groins.

## 2019-07-18 NOTE — CHART NOTE - NSCHARTNOTEFT_GEN_A_CORE
BRIEF PROCEDURE NOTE    SIRISHA JIMENEZ  [id number]    Pre-op Diagnosis: Atrial fibrillation    Post-op Diagnosis: Same    Procedure: EP study, Atrial Fibrillation Ablation    Electrophysiologist: Gosia Storm MD    Assistant: None    Anesthesia: General Anesthesia    Access: R and L Femoral veins    Description:  8Fr sheath LFV: decapolar catheter in CS  8Fr sheath RFV: ICE  8Fr sheath RFV: Transeptal, Mapping/ablation    ICE guided transeptal  Mapping/3D shell created of left atrium and PVs  esophogeal temp monitoring throughout  PVI, isolation confirmed with pacing  CTI: ablation TV to IVC, bidirectional block demonstrated with differential pacing  ablation around CS Os    EPS:  Normal Baseline conduction  No AP conduction    Complications: none    EBL: 20cc    Disposition: to CCU2/stable    Plan:  Apixiban tonight at 5PM  Bedrest 4 hours  stitched removed at 7PM  CXR  ECG  Omeprozole  toradol for chest disconfort

## 2019-07-19 ENCOUNTER — TRANSCRIPTION ENCOUNTER (OUTPATIENT)
Age: 59
End: 2019-07-19

## 2019-07-19 VITALS
SYSTOLIC BLOOD PRESSURE: 120 MMHG | RESPIRATION RATE: 17 BRPM | OXYGEN SATURATION: 95 % | HEART RATE: 60 BPM | DIASTOLIC BLOOD PRESSURE: 74 MMHG

## 2019-07-19 DIAGNOSIS — I48.2 CHRONIC ATRIAL FIBRILLATION: ICD-10-CM

## 2019-07-19 LAB
ALBUMIN SERPL ELPH-MCNC: 3.9 G/DL — SIGNIFICANT CHANGE UP (ref 3.3–5)
ALP SERPL-CCNC: 45 U/L — SIGNIFICANT CHANGE UP (ref 40–120)
ALT FLD-CCNC: 16 U/L — SIGNIFICANT CHANGE UP (ref 10–45)
ANION GAP SERPL CALC-SCNC: 10 MMOL/L — SIGNIFICANT CHANGE UP (ref 5–17)
AST SERPL-CCNC: 54 U/L — HIGH (ref 10–40)
BASOPHILS # BLD AUTO: 0 K/UL — SIGNIFICANT CHANGE UP (ref 0–0.2)
BASOPHILS NFR BLD AUTO: 0.2 % — SIGNIFICANT CHANGE UP (ref 0–2)
BILIRUB SERPL-MCNC: 0.6 MG/DL — SIGNIFICANT CHANGE UP (ref 0.2–1.2)
BUN SERPL-MCNC: 14 MG/DL — SIGNIFICANT CHANGE UP (ref 7–23)
CALCIUM SERPL-MCNC: 8.9 MG/DL — SIGNIFICANT CHANGE UP (ref 8.4–10.5)
CHLORIDE SERPL-SCNC: 105 MMOL/L — SIGNIFICANT CHANGE UP (ref 96–108)
CO2 SERPL-SCNC: 25 MMOL/L — SIGNIFICANT CHANGE UP (ref 22–31)
CREAT SERPL-MCNC: 0.85 MG/DL — SIGNIFICANT CHANGE UP (ref 0.5–1.3)
EOSINOPHIL # BLD AUTO: 0.1 K/UL — SIGNIFICANT CHANGE UP (ref 0–0.5)
EOSINOPHIL NFR BLD AUTO: 0.9 % — SIGNIFICANT CHANGE UP (ref 0–6)
GLUCOSE SERPL-MCNC: 118 MG/DL — HIGH (ref 70–99)
HCT VFR BLD CALC: 36.7 % — LOW (ref 39–50)
HGB BLD-MCNC: 12.2 G/DL — LOW (ref 13–17)
LYMPHOCYTES # BLD AUTO: 2.9 K/UL — SIGNIFICANT CHANGE UP (ref 1–3.3)
LYMPHOCYTES # BLD AUTO: 27.3 % — SIGNIFICANT CHANGE UP (ref 13–44)
MAGNESIUM SERPL-MCNC: 2.3 MG/DL — SIGNIFICANT CHANGE UP (ref 1.6–2.6)
MCHC RBC-ENTMCNC: 29.4 PG — SIGNIFICANT CHANGE UP (ref 27–34)
MCHC RBC-ENTMCNC: 33.1 GM/DL — SIGNIFICANT CHANGE UP (ref 32–36)
MCV RBC AUTO: 88.8 FL — SIGNIFICANT CHANGE UP (ref 80–100)
MONOCYTES # BLD AUTO: 1 K/UL — HIGH (ref 0–0.9)
MONOCYTES NFR BLD AUTO: 9.5 % — SIGNIFICANT CHANGE UP (ref 2–14)
NEUTROPHILS # BLD AUTO: 6.6 K/UL — SIGNIFICANT CHANGE UP (ref 1.8–7.4)
NEUTROPHILS NFR BLD AUTO: 62.1 % — SIGNIFICANT CHANGE UP (ref 43–77)
PHOSPHATE SERPL-MCNC: 3.9 MG/DL — SIGNIFICANT CHANGE UP (ref 2.5–4.5)
PLATELET # BLD AUTO: 195 K/UL — SIGNIFICANT CHANGE UP (ref 150–400)
POTASSIUM SERPL-MCNC: 3.9 MMOL/L — SIGNIFICANT CHANGE UP (ref 3.5–5.3)
POTASSIUM SERPL-SCNC: 3.9 MMOL/L — SIGNIFICANT CHANGE UP (ref 3.5–5.3)
PROT SERPL-MCNC: 6.5 G/DL — SIGNIFICANT CHANGE UP (ref 6–8.3)
RBC # BLD: 4.13 M/UL — LOW (ref 4.2–5.8)
RBC # FLD: 12.5 % — SIGNIFICANT CHANGE UP (ref 10.3–14.5)
SODIUM SERPL-SCNC: 140 MMOL/L — SIGNIFICANT CHANGE UP (ref 135–145)
WBC # BLD: 10.7 K/UL — HIGH (ref 3.8–10.5)
WBC # FLD AUTO: 10.7 K/UL — HIGH (ref 3.8–10.5)

## 2019-07-19 PROCEDURE — 82803 BLOOD GASES ANY COMBINATION: CPT

## 2019-07-19 PROCEDURE — C1730: CPT

## 2019-07-19 PROCEDURE — 83735 ASSAY OF MAGNESIUM: CPT

## 2019-07-19 PROCEDURE — 93662 INTRACARDIAC ECG (ICE): CPT

## 2019-07-19 PROCEDURE — 82435 ASSAY OF BLOOD CHLORIDE: CPT

## 2019-07-19 PROCEDURE — C1732: CPT

## 2019-07-19 PROCEDURE — 80053 COMPREHEN METABOLIC PANEL: CPT

## 2019-07-19 PROCEDURE — 82330 ASSAY OF CALCIUM: CPT

## 2019-07-19 PROCEDURE — 93623 PRGRMD STIMJ&PACG IV RX NFS: CPT

## 2019-07-19 PROCEDURE — C1893: CPT

## 2019-07-19 PROCEDURE — 84132 ASSAY OF SERUM POTASSIUM: CPT

## 2019-07-19 PROCEDURE — 93657 TX L/R ATRIAL FIB ADDL: CPT

## 2019-07-19 PROCEDURE — 85014 HEMATOCRIT: CPT

## 2019-07-19 PROCEDURE — 94660 CPAP INITIATION&MGMT: CPT

## 2019-07-19 PROCEDURE — 86900 BLOOD TYPING SEROLOGIC ABO: CPT

## 2019-07-19 PROCEDURE — 82947 ASSAY GLUCOSE BLOOD QUANT: CPT

## 2019-07-19 PROCEDURE — 93010 ELECTROCARDIOGRAM REPORT: CPT | Mod: 76

## 2019-07-19 PROCEDURE — 85027 COMPLETE CBC AUTOMATED: CPT

## 2019-07-19 PROCEDURE — 82565 ASSAY OF CREATININE: CPT

## 2019-07-19 PROCEDURE — 93655 ICAR CATH ABLTJ DSCRT ARRHYT: CPT

## 2019-07-19 PROCEDURE — 93656 COMPRE EP EVAL ABLTJ ATR FIB: CPT

## 2019-07-19 PROCEDURE — 85610 PROTHROMBIN TIME: CPT

## 2019-07-19 PROCEDURE — 83605 ASSAY OF LACTIC ACID: CPT

## 2019-07-19 PROCEDURE — 94640 AIRWAY INHALATION TREATMENT: CPT

## 2019-07-19 PROCEDURE — 86850 RBC ANTIBODY SCREEN: CPT

## 2019-07-19 PROCEDURE — 86901 BLOOD TYPING SEROLOGIC RH(D): CPT

## 2019-07-19 PROCEDURE — 93613 INTRACARDIAC EPHYS 3D MAPG: CPT

## 2019-07-19 PROCEDURE — C1894: CPT

## 2019-07-19 PROCEDURE — 85730 THROMBOPLASTIN TIME PARTIAL: CPT

## 2019-07-19 PROCEDURE — 84100 ASSAY OF PHOSPHORUS: CPT

## 2019-07-19 PROCEDURE — 93005 ELECTROCARDIOGRAM TRACING: CPT

## 2019-07-19 PROCEDURE — 84295 ASSAY OF SERUM SODIUM: CPT

## 2019-07-19 RX ORDER — KETOROLAC TROMETHAMINE 30 MG/ML
15 SYRINGE (ML) INJECTION ONCE
Refills: 0 | Status: DISCONTINUED | OUTPATIENT
Start: 2019-07-19 | End: 2019-07-19

## 2019-07-19 RX ORDER — FLECAINIDE ACETATE 50 MG
1.5 TABLET ORAL
Qty: 90 | Refills: 3
Start: 2019-07-19 | End: 2019-11-15

## 2019-07-19 RX ORDER — FLECAINIDE ACETATE 50 MG
75 TABLET ORAL EVERY 12 HOURS
Refills: 0 | Status: DISCONTINUED | OUTPATIENT
Start: 2019-07-19 | End: 2019-08-05

## 2019-07-19 RX ORDER — FLECAINIDE ACETATE 50 MG
1 TABLET ORAL
Qty: 0 | Refills: 0 | DISCHARGE

## 2019-07-19 RX ADMIN — Medication 100 MILLIGRAM(S): at 05:47

## 2019-07-19 RX ADMIN — Medication 1 GRAM(S): at 07:36

## 2019-07-19 RX ADMIN — POLYETHYLENE GLYCOL 3350 17 GRAM(S): 17 POWDER, FOR SOLUTION ORAL at 05:47

## 2019-07-19 RX ADMIN — Medication 0.25 MILLIGRAM(S): at 05:47

## 2019-07-19 RX ADMIN — Medication 15 MILLIGRAM(S): at 08:52

## 2019-07-19 RX ADMIN — PANTOPRAZOLE SODIUM 40 MILLIGRAM(S): 20 TABLET, DELAYED RELEASE ORAL at 05:47

## 2019-07-19 RX ADMIN — ATENOLOL 25 MILLIGRAM(S): 25 TABLET ORAL at 05:47

## 2019-07-19 RX ADMIN — Medication 50 MILLIGRAM(S): at 05:48

## 2019-07-19 RX ADMIN — Medication 1 SPRAY(S): at 10:10

## 2019-07-19 RX ADMIN — APIXABAN 5 MILLIGRAM(S): 2.5 TABLET, FILM COATED ORAL at 05:47

## 2019-07-19 NOTE — PROGRESS NOTE ADULT - PROBLEM SELECTOR PLAN 1
continue Apixaban 5mg 2x/day  continue you Flecanide at 75mg 2x/day  continue Atenolol 25mg 2x/day  continue your home dose nexium  No strenuous activity for 1-2 weeks  mechanical soft diet for 1 month continue Apixaban 5mg 2x/day  continue you Flecanide at 75mg 2x/day  continue Atenolol 25mg 2x/day  continue your home dose Nexium  No strenuous activity for 1-2 weeks  mechanical soft diet for 1 month  Will give Toradol for chest discomfort

## 2019-07-19 NOTE — PROGRESS NOTE ADULT - ASSESSMENT
58M PMH HTN, HLD, anxiety, acid reflux, IBS, BRADEN on CPAP, herniated disc, neuropathy, pAfib x 5 yrs, on Eliquis presents for AF ablation.

## 2019-07-19 NOTE — PROGRESS NOTE ADULT - SUBJECTIVE AND OBJECTIVE BOX
Patient is a 58y old  Male who presents with a chief complaint of Afib ablation (18 Jul 2019 21:41)    HPI: 58 years old  male with PMHx HTN, HLD, anxiety, acid reflux, IBS, BRADEN on CPAP, herniated disc, neuropathy, pAfib for 5 years, on Eliquis who presents for PST for planned elective Afib ablation scheduled on 7/18/19 with Dr Storm.    Overnight Event: No issues overnight    REVIEW OF SYSTEMS:  	    MEDICATIONS  (STANDING):  apixaban 5 milliGRAM(s) Oral two times a day  ATENolol  Tablet 25 milliGRAM(s) Oral two times a day  chlorhexidine 4% Liquid 1 Application(s) Topical <User Schedule>  clonazePAM  Tablet 0.5 milliGRAM(s) Oral at bedtime  clonazePAM  Tablet 0.125 milliGRAM(s) Oral <User Schedule>  clonazePAM  Tablet 0.25 milliGRAM(s) Oral <User Schedule>  docusate sodium 100 milliGRAM(s) Oral three times a day  famotidine    Tablet 40 milliGRAM(s) Oral two times a day  flecainide 50 milliGRAM(s) Oral every 12 hours  fluticasone propionate 50 MICROgram(s)/spray Nasal Spray 1 Spray(s) Both Nostrils daily  gabapentin 300 milliGRAM(s) Oral two times a day  loratadine 10 milliGRAM(s) Oral daily  pantoprazole    Tablet 40 milliGRAM(s) Oral two times a day  PARoxetine 20 milliGRAM(s) Oral daily  polyethylene glycol 3350 17 Gram(s) Oral two times a day  simvastatin 20 milliGRAM(s) Oral at bedtime  sucralfate suspension 1 Gram(s) Oral <User Schedule>    MEDICATIONS  (PRN):      PHYSICAL EXAM:  Vital Signs Last 24 Hrs  T(C): 36.3 (19 Jul 2019 06:00), Max: 36.8 (18 Jul 2019 19:00)  T(F): 97.4 (19 Jul 2019 06:00), Max: 98.3 (18 Jul 2019 19:00)  HR: 60 (19 Jul 2019 06:44) (58 - 87)  BP: 127/75 (19 Jul 2019 06:00) (110/55 - 160/72)  BP(mean): 95 (19 Jul 2019 06:00) (77 - 104)  RR: 18 (19 Jul 2019 06:00) (12 - 28)  SpO2: 98% (19 Jul 2019 06:44) (95% - 100%)  I&O's Summary    18 Jul 2019 07:01  -  19 Jul 2019 07:00  --------------------------------------------------------  IN: 245 mL / OUT: 1475 mL / NET: -1230 mL      Appearance: Normal	  HEENT:   Normal oral mucosa, PERRL, EOMI	  Lymphatic: No lymphadenopathy  Cardiovascular: Normal S1 S2, No JVD, No murmurs, No edema  Respiratory: Lungs clear to auscultation	  Psychiatry: A & O x 3, Mood & affect appropriate  Gastrointestinal:  Soft, Non-tender, + BS	  Skin: No rashes, No ecchymoses, No cyanosis	  Neurologic: Non-focal  Extremities: Normal range of motion, No clubbing, cyanosis or edema  Vascular: Peripheral pulses palpable 2+ bilaterally    LABS:	 	                        12.2   10.7  )-----------( 195      ( 19 Jul 2019 05:11 )             36.7     Auto Eosinophil # 0.1   / Auto Eosinophil % 0.9   / Auto Neutrophil # 6.6   / Auto Neutrophil % 62.1  / BANDS % x                            13.1   13.0  )-----------( 209      ( 18 Jul 2019 15:31 )             37.9     Auto Eosinophil # 0.0   / Auto Eosinophil % 0.2   / Auto Neutrophil # 11.3  / Auto Neutrophil % 87.2  / BANDS % x                            12.6   8.0   )-----------( 194      ( 17 Jul 2019 12:33 )             36.7     Auto Eosinophil # x     / Auto Eosinophil % x     / Auto Neutrophil # x     / Auto Neutrophil % x     / BANDS % x        INR: 1.16 ratio (07-18 @ 15:31)  INR: 1.01 ratio (07-17 @ 12:33)    07-19    140  |  105  |  14  ----------------------------<  118<H>  3.9   |  25  |  0.85  07-18    143  |  108  |  14  ----------------------------<  127<H>  4.2   |  23  |  0.82  07-17    138  |  100  |  17  ----------------------------<  105<H>  4.5   |  27  |  0.90    Ca    8.9      19 Jul 2019 05:11  Mg     2.3     07-19  Phos  3.9     07-19  TPro  6.5  /  Alb  3.9  /  TBili  0.6  /  DBili  x   /  AST  54<H>  /  ALT  16  /  AlkPhos  45  07-19  TPro  6.7  /  Alb  4.0  /  TBili  0.5  /  DBili  x   /  AST  51<H>  /  ALT  18  /  AlkPhos  47  07-18  TPro  6.8  /  Alb  4.3  /  TBili  0.5  /  DBili  x   /  AST  20  /  ALT  16  /  AlkPhos  51  07-17      TELEMETRY: 	    ECG:  	  RADIOLOGY:  OTHER: 	    PREVIOUS DIAGNOSTIC TESTING:    [ ] Echocardiogram:  	  	  TORIE [facility]  Contact #[phone] Patient is a 58y old  Male who presents with a chief complaint of Afib ablation (18 Jul 2019 21:41)    HPI: 58 years old  male with PMHx HTN, HLD, anxiety, acid reflux, IBS, BRADEN on CPAP, herniated disc, neuropathy, pAfib for 5 years, on Eliquis who presents for PST for planned elective Afib ablation scheduled on 7/18/19 with Dr Storm.    Overnight Event: No issues overnight    REVIEW OF SYSTEMS: has some chest discomfort, aching  	    MEDICATIONS  (STANDING):  apixaban 5 milliGRAM(s) Oral two times a day  ATENolol  Tablet 25 milliGRAM(s) Oral two times a day  chlorhexidine 4% Liquid 1 Application(s) Topical <User Schedule>  clonazePAM  Tablet 0.5 milliGRAM(s) Oral at bedtime  clonazePAM  Tablet 0.125 milliGRAM(s) Oral <User Schedule>  clonazePAM  Tablet 0.25 milliGRAM(s) Oral <User Schedule>  docusate sodium 100 milliGRAM(s) Oral three times a day  famotidine    Tablet 40 milliGRAM(s) Oral two times a day  flecainide 50 milliGRAM(s) Oral every 12 hours  fluticasone propionate 50 MICROgram(s)/spray Nasal Spray 1 Spray(s) Both Nostrils daily  gabapentin 300 milliGRAM(s) Oral two times a day  loratadine 10 milliGRAM(s) Oral daily  pantoprazole    Tablet 40 milliGRAM(s) Oral two times a day  PARoxetine 20 milliGRAM(s) Oral daily  polyethylene glycol 3350 17 Gram(s) Oral two times a day  simvastatin 20 milliGRAM(s) Oral at bedtime  sucralfate suspension 1 Gram(s) Oral <User Schedule>    MEDICATIONS  (PRN):      PHYSICAL EXAM:  Vital Signs Last 24 Hrs  T(C): 36.3 (19 Jul 2019 06:00), Max: 36.8 (18 Jul 2019 19:00)  T(F): 97.4 (19 Jul 2019 06:00), Max: 98.3 (18 Jul 2019 19:00)  HR: 60 (19 Jul 2019 06:44) (58 - 87)  BP: 127/75 (19 Jul 2019 06:00) (110/55 - 160/72)  BP(mean): 95 (19 Jul 2019 06:00) (77 - 104)  RR: 18 (19 Jul 2019 06:00) (12 - 28)  SpO2: 98% (19 Jul 2019 06:44) (95% - 100%)  I&O's Summary    18 Jul 2019 07:01  -  19 Jul 2019 07:00  --------------------------------------------------------  IN: 245 mL / OUT: 1475 mL / NET: -1230 mL      Appearance: Normal	  HEENT:   Normal oral mucosa, PERRL, EOMI	  Lymphatic: No lymphadenopathy  Cardiovascular: Normal S1 S2, No JVD, No murmurs, No edema  Respiratory: Lungs clear to auscultation	  Psychiatry: A & O x 3, Mood & affect appropriate  Gastrointestinal:  Soft, Non-tender, + BS	  Skin: No rashes, No ecchymoses, No cyanosis	  Neurologic: Non-focal  Extremities: Normal range of motion, No clubbing, cyanosis or edema  Vascular: Peripheral pulses palpable 2+ bilaterally    LABS:	 	                        12.2   10.7  )-----------( 195      ( 19 Jul 2019 05:11 )             36.7     Auto Eosinophil # 0.1   / Auto Eosinophil % 0.9   / Auto Neutrophil # 6.6   / Auto Neutrophil % 62.1  / BANDS % x                            13.1   13.0  )-----------( 209      ( 18 Jul 2019 15:31 )             37.9     Auto Eosinophil # 0.0   / Auto Eosinophil % 0.2   / Auto Neutrophil # 11.3  / Auto Neutrophil % 87.2  / BANDS % x                            12.6   8.0   )-----------( 194      ( 17 Jul 2019 12:33 )             36.7     Auto Eosinophil # x     / Auto Eosinophil % x     / Auto Neutrophil # x     / Auto Neutrophil % x     / BANDS % x        INR: 1.16 ratio (07-18 @ 15:31)  INR: 1.01 ratio (07-17 @ 12:33)    07-19    140  |  105  |  14  ----------------------------<  118<H>  3.9   |  25  |  0.85  07-18    143  |  108  |  14  ----------------------------<  127<H>  4.2   |  23  |  0.82  07-17    138  |  100  |  17  ----------------------------<  105<H>  4.5   |  27  |  0.90    Ca    8.9      19 Jul 2019 05:11  Mg     2.3     07-19  Phos  3.9     07-19  TPro  6.5  /  Alb  3.9  /  TBili  0.6  /  DBili  x   /  AST  54<H>  /  ALT  16  /  AlkPhos  45  07-19  TPro  6.7  /  Alb  4.0  /  TBili  0.5  /  DBili  x   /  AST  51<H>  /  ALT  18  /  AlkPhos  47  07-18  TPro  6.8  /  Alb  4.3  /  TBili  0.5  /  DBili  x   /  AST  20  /  ALT  16  /  AlkPhos  51  07-17      TELEMETRY: 	  PVC"S  ECG:  	Normal Sinus RHYTHM  RADIOLOGY:  OTHER: 	    PREVIOUS DIAGNOSTIC TESTING:    [ ] Echocardiogram:  	  	  [facility]  Contact #[phone] Patient is a 58y old  Male who presents with a chief complaint of Afib ablation (18 Jul 2019 21:41)    HPI: 58 years old  male with PMHx HTN, HLD, anxiety, acid reflux, IBS, BRADEN on CPAP, herniated disc, neuropathy, pAfib for 5 years, on Eliquis who presents for PST for planned elective Afib ablation scheduled on 7/18/19 with Dr Storm.    Overnight Event: No issues overnight    REVIEW OF SYSTEMS: has some chest discomfort, aching  	    MEDICATIONS  (STANDING):  apixaban 5 milliGRAM(s) Oral two times a day  ATENolol  Tablet 25 milliGRAM(s) Oral two times a day  chlorhexidine 4% Liquid 1 Application(s) Topical <User Schedule>  clonazePAM  Tablet 0.5 milliGRAM(s) Oral at bedtime  clonazePAM  Tablet 0.125 milliGRAM(s) Oral <User Schedule>  clonazePAM  Tablet 0.25 milliGRAM(s) Oral <User Schedule>  docusate sodium 100 milliGRAM(s) Oral three times a day  famotidine    Tablet 40 milliGRAM(s) Oral two times a day  flecainide 50 milliGRAM(s) Oral every 12 hours  fluticasone propionate 50 MICROgram(s)/spray Nasal Spray 1 Spray(s) Both Nostrils daily  gabapentin 300 milliGRAM(s) Oral two times a day  loratadine 10 milliGRAM(s) Oral daily  pantoprazole    Tablet 40 milliGRAM(s) Oral two times a day  PARoxetine 20 milliGRAM(s) Oral daily  polyethylene glycol 3350 17 Gram(s) Oral two times a day  simvastatin 20 milliGRAM(s) Oral at bedtime  sucralfate suspension 1 Gram(s) Oral <User Schedule>    MEDICATIONS  (PRN):      PHYSICAL EXAM:  Vital Signs Last 24 Hrs  T(C): 36.3 (19 Jul 2019 06:00), Max: 36.8 (18 Jul 2019 19:00)  T(F): 97.4 (19 Jul 2019 06:00), Max: 98.3 (18 Jul 2019 19:00)  HR: 60 (19 Jul 2019 06:44) (58 - 87)  BP: 127/75 (19 Jul 2019 06:00) (110/55 - 160/72)  BP(mean): 95 (19 Jul 2019 06:00) (77 - 104)  RR: 18 (19 Jul 2019 06:00) (12 - 28)  SpO2: 98% (19 Jul 2019 06:44) (95% - 100%)  I&O's Summary    18 Jul 2019 07:01  -  19 Jul 2019 07:00  --------------------------------------------------------  IN: 245 mL / OUT: 1475 mL / NET: -1230 mL      Appearance: Normal	  HEENT:   Normal oral mucosa, PERRL, EOMI	  Cardiovascular: Normal S1 S2, No JVD, No murmurs, No edema  Respiratory: Lungs clear to auscultation	  Psychiatry: A & O x 3, Mood & affect appropriate  Gastrointestinal:  Soft, Non-tender, + BS	  Skin: No rashes, No ecchymoses, No cyanosis	  Neurologic: Non-focal  Extremities: Normal range of motion, No clubbing, cyanosis or edema  Vascular: Peripheral pulses palpable 2+ bilaterally  Bilateral groin sites with no hematoma, bleeding or ecchymosis  LABS:	 	                        12.2   10.7  )-----------( 195      ( 19 Jul 2019 05:11 )             36.7     Auto Eosinophil # 0.1   / Auto Eosinophil % 0.9   / Auto Neutrophil # 6.6   / Auto Neutrophil % 62.1  / BANDS % x                            13.1   13.0  )-----------( 209      ( 18 Jul 2019 15:31 )             37.9     Auto Eosinophil # 0.0   / Auto Eosinophil % 0.2   / Auto Neutrophil # 11.3  / Auto Neutrophil % 87.2  / BANDS % x                            12.6   8.0   )-----------( 194      ( 17 Jul 2019 12:33 )             36.7     Auto Eosinophil # x     / Auto Eosinophil % x     / Auto Neutrophil # x     / Auto Neutrophil % x     / BANDS % x        INR: 1.16 ratio (07-18 @ 15:31)  INR: 1.01 ratio (07-17 @ 12:33)    07-19    140  |  105  |  14  ----------------------------<  118<H>  3.9   |  25  |  0.85  07-18    143  |  108  |  14  ----------------------------<  127<H>  4.2   |  23  |  0.82  07-17    138  |  100  |  17  ----------------------------<  105<H>  4.5   |  27  |  0.90    Ca    8.9      19 Jul 2019 05:11  Mg     2.3     07-19  Phos  3.9     07-19  TPro  6.5  /  Alb  3.9  /  TBili  0.6  /  DBili  x   /  AST  54<H>  /  ALT  16  /  AlkPhos  45  07-19  TPro  6.7  /  Alb  4.0  /  TBili  0.5  /  DBili  x   /  AST  51<H>  /  ALT  18  /  AlkPhos  47  07-18  TPro  6.8  /  Alb  4.3  /  TBili  0.5  /  DBili  x   /  AST  20  /  ALT  16  /  AlkPhos  51  07-17      TELEMETRY: 	  PVC"S  ECG:  	Normal Sinus RHYTHM  RADIOLOGY:  OTHER: 	    	  Shruti Pal, TORIE-[facility]  Contact #[phone]

## 2019-07-19 NOTE — DISCHARGE NOTE NURSING/CASE MANAGEMENT/SOCIAL WORK - NSDCDPATPORTLINK_GEN_ALL_CORE
You can access the [facility] Patient Portal, offered by [facility], by registering with the following website: [email or web address]

## 2019-07-19 NOTE — PROGRESS NOTE ADULT - ATTENDING COMMENTS
Patient status post successful catheter ablation of atrial fibrillation.  Antiarrhythmic and anticoagulation per EP.    Follow-up as outpatient.

## 2019-08-26 ENCOUNTER — APPOINTMENT (OUTPATIENT)
Dept: ELECTROPHYSIOLOGY | Facility: CLINIC | Age: 59
End: 2019-08-26

## 2019-08-26 ENCOUNTER — APPOINTMENT (OUTPATIENT)
Dept: ELECTROPHYSIOLOGY | Facility: CLINIC | Age: 59
End: 2019-08-26
Payer: COMMERCIAL

## 2019-08-26 ENCOUNTER — NON-APPOINTMENT (OUTPATIENT)
Age: 59
End: 2019-08-26

## 2019-08-26 VITALS
OXYGEN SATURATION: 98 % | HEART RATE: 60 BPM | DIASTOLIC BLOOD PRESSURE: 73 MMHG | HEIGHT: 73 IN | BODY MASS INDEX: 36.45 KG/M2 | SYSTOLIC BLOOD PRESSURE: 113 MMHG | WEIGHT: 275 LBS

## 2019-08-26 VITALS — SYSTOLIC BLOOD PRESSURE: 137 MMHG | DIASTOLIC BLOOD PRESSURE: 89 MMHG

## 2019-08-26 VITALS — DIASTOLIC BLOOD PRESSURE: 77 MMHG | SYSTOLIC BLOOD PRESSURE: 108 MMHG

## 2019-08-26 VITALS — DIASTOLIC BLOOD PRESSURE: 88 MMHG | SYSTOLIC BLOOD PRESSURE: 133 MMHG

## 2019-08-26 LAB
BASOPHILS # BLD AUTO: 0.06 K/UL
BASOPHILS NFR BLD AUTO: 0.7 %
EOSINOPHIL # BLD AUTO: 0.17 K/UL
EOSINOPHIL NFR BLD AUTO: 2 %
HCT VFR BLD CALC: 42.1 %
HGB BLD-MCNC: 13.5 G/DL
IMM GRANULOCYTES NFR BLD AUTO: 0.4 %
LYMPHOCYTES # BLD AUTO: 2.83 K/UL
LYMPHOCYTES NFR BLD AUTO: 34 %
MAN DIFF?: NORMAL
MCHC RBC-ENTMCNC: 29.1 PG
MCHC RBC-ENTMCNC: 32.1 GM/DL
MCV RBC AUTO: 90.7 FL
MONOCYTES # BLD AUTO: 0.66 K/UL
MONOCYTES NFR BLD AUTO: 7.9 %
NEUTROPHILS # BLD AUTO: 4.58 K/UL
NEUTROPHILS NFR BLD AUTO: 55 %
PLATELET # BLD AUTO: 273 K/UL
RBC # BLD: 4.64 M/UL
RBC # FLD: 13.2 %
TSH SERPL-ACNC: 3.54 UIU/ML
WBC # FLD AUTO: 8.33 K/UL

## 2019-08-26 PROCEDURE — 99215 OFFICE O/P EST HI 40 MIN: CPT

## 2019-08-26 PROCEDURE — 93000 ELECTROCARDIOGRAM COMPLETE: CPT

## 2019-08-27 LAB
ANION GAP SERPL CALC-SCNC: 15 MMOL/L
BUN SERPL-MCNC: 15 MG/DL
CALCIUM SERPL-MCNC: 9.9 MG/DL
CHLORIDE SERPL-SCNC: 100 MMOL/L
CO2 SERPL-SCNC: 25 MMOL/L
CREAT SERPL-MCNC: 0.93 MG/DL
GLUCOSE SERPL-MCNC: 107 MG/DL
POTASSIUM SERPL-SCNC: 5.2 MMOL/L
SODIUM SERPL-SCNC: 140 MMOL/L

## 2019-08-27 NOTE — DISCUSSION/SUMMARY
[FreeTextEntry1] : In summary the patient is a 58-year-old gentleman with recurrent symptomatic H. fibrillation status post ablation. His atrial fibrillation seems to be settling down but it is unclear why he is developing orthostatic hypotension. He has been on 25 of atenolol morning and 12 point 5 in the evening. He is going to discontinue the evening atenolol and see how he does. He will continue to try and stay hydrated. In addition I took the liberty of checking labs including a CBC, and metabolic panel. These were all essentially within normal limits and no cause for his orthostasis.

## 2019-08-27 NOTE — HISTORY OF PRESENT ILLNESS
[FreeTextEntry1] : I had the pleasure of seeing your patient Curtis Tatum today in the arrhythmia clinic of [facility]. As you well no the patient is a delightful but anxious 58-year-old gentleman with a history of hypertension, hyperlipidemia, sleep apnea, obesity and atrial fibrillation. Over the years the patient reported vague episodes of chest pain and occasional dizziness. In addition he had palpitations. He thinks the atrial fibrillation probably started around 2014. He underwent an evaluation at that time and he was under quite a bit of stress. Things settled down and he went for about 2 years without any episodes but they seem to have increased again. Workup in the past included an event recorder without arrhythmia. He was started on low dose atenolol which seemed to reduce his palpitations. The patient had lost weight and in 2014 had come off his medications. He had an acute rise in his blood pressure and what he described as a panic attack with his heart rate going to 140 beats per minute. He went back on atenolol and this terminated the episode acutely. Ultimately he was diagnosed with atrial fibrillation and has been managed with aspirin and beta blocker only. The patient had a stress test and echocardiogram which were unremarkable. He does report he was having increasing episodes of the atrial fibrillation and was on serial increases of the atenolol. He had been prescribed 25 mg twice a day but ultimately on his own was taking 50 in the morning 25 in the middle of the day and 50 at night. He wore a monitor from March 5 through the 13th 2019 which showed a heart rate between 34 and 143 beats per minute with an average of 52. He was in atrial fibrillation 4% of the time with the longest episode being 2 hours and 43 minutes and heart rates ranging from  beats per minute. He also had runs of what appeared to be an atrial tachycardia. He was advised to decrease the atenolol to 50 in the morning and 25 in the evening. \par \par Echocardiogram February 13, 2019: Normal left ventricular size and function with ejection fraction of 60-65%. Left atrial enlargement of 4.7 cm. No significant valvular disease\par \par A chemical stress test March 26th 2019 normal left ventricular size and function with an ejection fraction of 60% and no evidence of ischemia\par \par When I first saw him in clinic we discussed the treatment options. We discussed trying flecainide versus catheter ablation. The patient opted for medical approach but did not take the medications initially. He'll do that when he started taking them and there was some initial success but he began having breakthroughs. He therefore increased the dose from 100 mg twice a day to 150 mg twice a day but was still having breakthrough episodes. We switched him to amiodarone to try to settle things down until he has the ablation. The patient did not feel that the amiodarone was helping and switched himself back to flecainide. He is currently taking 50 mg twice a day, he is on anticoagulation and takes anywhere from 25-37.5 mg of atenolol a day. The patient has opted for an ablative approach. And underwent an atrial fibrillation ablation procedure on July 18, 2019. The patient had a fair amount of early recurrent teacher fibrillation. We continued him on flecainide and atenolol and things began to slowly settled down. He returns to clinic today for followup.\par \par Overall wean is doing well. He has not had any atrial fibrillation in 2-1/2 weeks. His biggest complaint is neck pain probably from cervical spine disease. In addition he noted that he was feeling lightheaded at times when he stood up. I had him do orthostatics at home and he seemed to be fairly orthostatic. I had orthostatics done here in clinic today and his blood pressure went from 137/89 laying 108/77 standing. It is unclear why he suddenly has become orthostatic as he has been on stable medications. His EKG revealed sinus rhythm today is 63 beats per minute and was normal.

## 2019-10-08 PROBLEM — M50.20 OTHER CERVICAL DISC DISPLACEMENT, UNSPECIFIED CERVICAL REGION: Chronic | Status: ACTIVE | Noted: 2019-07-17

## 2019-10-08 PROBLEM — G62.9 POLYNEUROPATHY, UNSPECIFIED: Chronic | Status: ACTIVE | Noted: 2019-07-17

## 2019-10-08 PROBLEM — G47.33 OBSTRUCTIVE SLEEP APNEA (ADULT) (PEDIATRIC): Chronic | Status: ACTIVE | Noted: 2019-07-17

## 2019-10-14 ENCOUNTER — APPOINTMENT (OUTPATIENT)
Dept: ELECTROPHYSIOLOGY | Facility: CLINIC | Age: 59
End: 2019-10-14
Payer: COMMERCIAL

## 2019-10-14 ENCOUNTER — NON-APPOINTMENT (OUTPATIENT)
Age: 59
End: 2019-10-14

## 2019-10-14 VITALS — DIASTOLIC BLOOD PRESSURE: 86 MMHG | SYSTOLIC BLOOD PRESSURE: 126 MMHG

## 2019-10-14 VITALS
SYSTOLIC BLOOD PRESSURE: 133 MMHG | DIASTOLIC BLOOD PRESSURE: 83 MMHG | HEIGHT: 73 IN | HEART RATE: 67 BPM | OXYGEN SATURATION: 98 %

## 2019-10-14 VITALS — DIASTOLIC BLOOD PRESSURE: 87 MMHG | SYSTOLIC BLOOD PRESSURE: 120 MMHG

## 2019-10-14 PROCEDURE — 93000 ELECTROCARDIOGRAM COMPLETE: CPT

## 2019-10-14 PROCEDURE — 99214 OFFICE O/P EST MOD 30 MIN: CPT

## 2019-10-25 ENCOUNTER — MEDICATION RENEWAL (OUTPATIENT)
Age: 59
End: 2019-10-25

## 2019-10-27 NOTE — HISTORY OF PRESENT ILLNESS
[FreeTextEntry1] : I had the pleasure of seeing your patient Curtis Tatum today in the arrhythmia clinic of [facility]. As you well no the patient is a delightful but anxious 58-year-old gentleman with a history of hypertension, hyperlipidemia, sleep apnea, obesity and atrial fibrillation. Over the years the patient reported vague episodes of chest pain and occasional dizziness. In addition he had palpitations. He thinks the atrial fibrillation probably started around 2014. He underwent an evaluation at that time and he was under quite a bit of stress. Things settled down and he went for about 2 years without any episodes but they seem to have increased again. Workup in the past included an event recorder without arrhythmia. He was started on low dose atenolol which seemed to reduce his palpitations. The patient had lost weight and in 2014 had come off his medications. He had an acute rise in his blood pressure and what he described as a panic attack with his heart rate going to 140 beats per minute. He went back on atenolol and this terminated the episode acutely. Ultimately he was diagnosed with atrial fibrillation and has been managed with aspirin and beta blocker only. The patient had a stress test and echocardiogram which were unremarkable. He does report he was having increasing episodes of the atrial fibrillation and was on serial increases of the atenolol. He had been prescribed 25 mg twice a day but ultimately on his own was taking 50 in the morning 25 in the middle of the day and 50 at night. He wore a monitor from March 5 through the 13th 2019 which showed a heart rate between 34 and 143 beats per minute with an average of 52. He was in atrial fibrillation 4% of the time with the longest episode being 2 hours and 43 minutes and heart rates ranging from  beats per minute. He also had runs of what appeared to be an atrial tachycardia. He was advised to decrease the atenolol to 50 in the morning and 25 in the evening. \par \par Echocardiogram February 13, 2019: Normal left ventricular size and function with ejection fraction of 60-65%. Left atrial enlargement of 4.7 cm. No significant valvular disease\par \par A chemical stress test March 26th 2019 normal left ventricular size and function with an ejection fraction of 60% and no evidence of ischemia\par \par When I first saw him in clinic we discussed the treatment options. We discussed trying flecainide versus catheter ablation. The patient opted for medical approach but did not take the medications initially. He'll do that when he started taking them and there was some initial success but he began having breakthroughs. He therefore increased the dose from 100 mg twice a day to 150 mg twice a day but was still having breakthrough episodes. We switched him to amiodarone to try to settle things down until he has the ablation. The patient did not feel that the amiodarone was helping and switched himself back to flecainide. He is currently taking 50 mg twice a day, he is on anticoagulation and takes anywhere from 25-37.5 mg of atenolol a day. The patient has opted for an ablative approach. And underwent an atrial fibrillation ablation procedure on July 18, 2019. The patient had a fair amount of early recurrent atrial fibrillation. We continued him on flecainide and atenolol and things began to slowly settled down. He was maintained on low dose atenolol and flecainide and his atrial fibrillation had gone away. He did complain of significant orthostatic hypotension which seems to also settle down. Most recently he has complained of a feeling of pauses and skipped beats.\par \par He returns to clinic today for followup.Overall he is doing quite well. He feels like he has not had atrial fibrillation for close to 10 weeks now. He describes rare flutters but mostly has complained about some pauses. These were worse when he got an argument with his son. Today in clinic his blood pressure was 120/87 and his pulse was 60 and regular. EKG revealed sinus rhythm at 60 beats per minute and was normal. He remains on flecainide 50 mg twice a day, atenolol 25 mg and Eliquis.\par \par

## 2019-10-27 NOTE — DISCUSSION/SUMMARY
[FreeTextEntry1] : In summary the patient is a 58-year-old gentleman with recurrent symptomatic paroxysmal atrial fibrillation. He is status post ablation on July 18, 2019. He had early recurrent atrial fibrillation which has now settled down. What he is describing now it sounds like either APCs or PVCs and we are providing him with a monitor to better define what is causing his symptomatology. Overall he is doing well and is less anxious than he was prior to the ablation about his arrhythmia.

## 2019-11-12 ENCOUNTER — MEDICATION RENEWAL (OUTPATIENT)
Age: 59
End: 2019-11-12

## 2019-11-21 ENCOUNTER — RX RENEWAL (OUTPATIENT)
Age: 59
End: 2019-11-21

## 2019-11-26 ENCOUNTER — APPOINTMENT (OUTPATIENT)
Dept: NEUROLOGY | Facility: CLINIC | Age: 59
End: 2019-11-26
Payer: COMMERCIAL

## 2019-11-26 VITALS
HEIGHT: 73 IN | SYSTOLIC BLOOD PRESSURE: 132 MMHG | WEIGHT: 285 LBS | HEART RATE: 60 BPM | DIASTOLIC BLOOD PRESSURE: 89 MMHG | BODY MASS INDEX: 37.77 KG/M2

## 2019-11-26 DIAGNOSIS — Z82.3 FAMILY HISTORY OF STROKE: ICD-10-CM

## 2019-11-26 DIAGNOSIS — G60.9 HEREDITARY AND IDIOPATHIC NEUROPATHY, UNSPECIFIED: ICD-10-CM

## 2019-11-26 DIAGNOSIS — G31.84 MILD COGNITIVE IMPAIRMENT, SO STATED: ICD-10-CM

## 2019-11-26 PROCEDURE — 99205 OFFICE O/P NEW HI 60 MIN: CPT

## 2019-11-26 RX ORDER — ESOMEPRAZOLE MAGNESIUM 40 MG/1
40 CAPSULE, DELAYED RELEASE ORAL
Refills: 0 | Status: ACTIVE | COMMUNITY

## 2019-11-26 NOTE — HISTORY OF PRESENT ILLNESS
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 Patient is otherwise fully functional, he does admit that he suffers from severe anxiety/mood disorder, he has been on Paxil and Klonopin in addition to gabapentin that was started by his psychiatrist. \par \par Patient also suffers from tingling numbness sensation in lower extremities of several years, he has been under control Dr. Cortez the neurologist, he has had extensive workup for Neuropathy; i.e; EMG/MCV studies and lab panel, as per the patient, was told his neuropathy was of unclear etiology, he has noted some relief with the gabapentin. Patient also reports chronic neck pains that radiate to the arms, he has followed up with Dr. Cardona, MRI of the C-spine demonstrated multilevel DJD with no impingement, he has been treated conservatively.

## 2019-11-26 NOTE — REASON FOR VISIT
[FreeTextEntry1] : Referred by Dr. Cardona for evaluation of memory issues, anxiety and neuropathy [Consultation] : a consultation visit

## 2019-11-26 NOTE — CONSULT LETTER
[Consult Letter:] : I had the pleasure of evaluating your patient, [unfilled]. [DrVeronica  ___] : Dr. FERNANDEZ [Dear  ___] : Dear  [unfilled],

## 2019-11-26 NOTE — DATA REVIEWED
[de-identified] : 8/27/18: MRI brain: Non-specific T2/flair hyperintensities within the right frontal and subcortical white matter, can be seen in setting of vasospastic phenomenon such as migraines [de-identified] : 8/2019: TSH normal

## 2019-11-26 NOTE — PHYSICAL EXAM
[___ / 30] : the patient achieved a total score of [unfilled] /30 [___ / 5] : Visuospatial / Executive: [unfilled] / 5 [___ / 3] : Attention (Serial 7 subtraction): [unfilled] / 3 [___ / 1] : Fluency: [unfilled] / 1 [___ / 6] : Orientation: [unfilled] / 6 [___ / 5] : Delayed Recall: [unfilled] / 5 [___ / 2] : Abstraction: [unfilled] / 2 [General Appearance - Alert] : alert [FreeTextEntry1] : obese [General Appearance - In No Acute Distress] : in no acute distress [Oriented To Time, Place, And Person] : oriented to person, place, and time [Impaired Insight] : insight and judgment were intact [Affect] : the affect was normal [Person] : oriented to person [Time] : oriented to time [Concentration Intact] : normal concentrating ability [Place] : oriented to place [Naming Objects] : no difficulty naming common objects [Visual Intact] : visual attention was ~T not ~L decreased [Repeating Phrases] : no difficulty repeating a phrase [Writing A Sentence] : no difficulty writing a sentence [Reading] : reading intact [Comprehension] : comprehension intact [Fluency] : fluency intact [Past History] : adequate knowledge of personal past history [Cranial Nerves Optic (II)] : visual acuity intact bilaterally,  visual fields full to confrontation, pupils equal round and reactive to light [Cranial Nerves Oculomotor (III)] : extraocular motion intact [Cranial Nerves Trigeminal (V)] : facial sensation intact symmetrically [Cranial Nerves Facial (VII)] : face symmetrical [Cranial Nerves Vestibulocochlear (VIII)] : hearing was intact bilaterally [Cranial Nerves Accessory (XI - Cranial And Spinal)] : head turning and shoulder shrug symmetric [Cranial Nerves Glossopharyngeal (IX)] : tongue and palate midline [Motor Tone] : muscle tone was normal in all four extremities [Cranial Nerves Hypoglossal (XII)] : there was no tongue deviation with protrusion [Sensation Pain / Temperature Decrease] : pain and temperature was intact [No Muscle Atrophy] : normal bulk in all four extremities [Motor Strength] : muscle strength was normal in all four extremities [Sensation Tactile Decrease] : light touch was intact [Vibration Decrease Leg / Foot Both Ankles] : decreased at both ankles [Proprioception] : proprioception was intact [Vibration Decrease Leg / Foot Toes Both Feet] : decreased at the toes of both feet  [Abnormal Walk] : normal gait [Balance] : balance was intact [Past-pointing] : there was no past-pointing [Tremor] : no tremor present [2+] : Patella left 2+ [1+] : Ankle jerk left 1+ [Plantar Reflex Right Only] : normal on the right [Plantar Reflex Left Only] : normal on the left [PERRL With Normal Accommodation] : pupils were equal in size, round, reactive to light, with normal accommodation [Full Visual Field] : full visual field [Extraocular Movements] : extraocular movements were intact [Outer Ear] : the ears and nose were normal in appearance [Hearing Threshold Finger Rub Not [place]] : hearing was normal [Neck Cervical Mass (___cm)] : no neck mass was observed [Heart Sounds] : normal S1 and S2 [Auscultation Breath Sounds / Voice Sounds] : lungs were clear to auscultation bilaterally [Arterial Pulses Carotid] : carotid pulses were normal with no bruits [Edema] : there was no peripheral edema [Involuntary Movements] : no involuntary movements were seen [] : no rash

## 2019-11-26 NOTE — REVIEW OF SYSTEMS
[Recent Weight Gain (___ Lbs)] : recent [unfilled] ~Ulb weight gain [Anxiety] : anxiety [Sleep Disturbances] : sleep disturbances [Decr. Concentrating Ability] : decreased concentrating ability [Memory Lapses or Loss] : memory loss [Tingling] : tingling [Numbness] : numbness [Negative] : Respiratory

## 2019-11-26 NOTE — DISCUSSION/SUMMARY
[FreeTextEntry1] : 59-year-old RH male with PMHx of hypercholesterolemia, atrial fibrillation, BRADEN, anxiety/mood disorder, C-spine DJD and peripheral neuropathy presents with complaints of short-term memory issues; i.e; word/name retrieval, difficulty with multitasking since past few months.\par \par # Mild cognitive impairment; multiple etiologies. MoCA 26/30\par \par - I have recommended labs: B12 folate and MMA level\par - We will perform cognitive assessment in 5-6 weeks\par - I reassured the patient that he does not have dementia at present\par - Have recommended cognitive exercises\par \par # anxiety/mood disorder, \par \par -continue Paxil and Klonopin\par \par # Peripheral neuropathy will workup done by Dr. Cortez\par \par - Continue gabapentin 300 mg bid \par \par # C-spine DJD; with Cervicalgia\par \par - Followup with Dr. Cardona

## 2020-02-07 ENCOUNTER — APPOINTMENT (OUTPATIENT)
Dept: NEUROLOGY | Facility: CLINIC | Age: 60
End: 2020-02-07

## 2020-02-17 ENCOUNTER — TRANSCRIPTION ENCOUNTER (OUTPATIENT)
Age: 60
End: 2020-02-17

## 2020-02-26 ENCOUNTER — APPOINTMENT (OUTPATIENT)
Dept: NEUROSURGERY | Facility: CLINIC | Age: 60
End: 2020-02-26

## 2020-03-04 ENCOUNTER — APPOINTMENT (OUTPATIENT)
Dept: NEUROSURGERY | Facility: CLINIC | Age: 60
End: 2020-03-04
Payer: COMMERCIAL

## 2020-03-04 VITALS
BODY MASS INDEX: 37.11 KG/M2 | TEMPERATURE: 97.7 F | DIASTOLIC BLOOD PRESSURE: 82 MMHG | HEART RATE: 58 BPM | WEIGHT: 280 LBS | OXYGEN SATURATION: 97 % | HEIGHT: 73 IN | SYSTOLIC BLOOD PRESSURE: 122 MMHG

## 2020-03-04 DIAGNOSIS — M43.12 SPONDYLOLISTHESIS, CERVICAL REGION: ICD-10-CM

## 2020-03-04 PROCEDURE — 99214 OFFICE O/P EST MOD 30 MIN: CPT

## 2020-03-10 ENCOUNTER — APPOINTMENT (OUTPATIENT)
Dept: COLORECTAL SURGERY | Facility: CLINIC | Age: 60
End: 2020-03-10

## 2020-03-19 PROBLEM — M43.12 SPONDYLOLISTHESIS OF CERVICAL REGION: Status: ACTIVE | Noted: 2020-03-19

## 2020-03-19 NOTE — CONSULT LETTER
[Dear  ___] : Dear  [unfilled], [Sincerely,] : Sincerely, [FreeTextEntry2] : Carrillo Curran MD\par [id number]\par [place] [id number] \par  [FreeTextEntry1] : I have seen your patient Curtis Tatum in my office to evaluate his ongoing problems with headaches, neck pain, and numbness and tingling involving both arms right more than left.  It is now just over a year since we saw the patient previously.  The patient has undergone extensive physical therapy which may have mitigated his pain but certainly has not eliminated it.  The patient continues to take medications on a regular basis.  His symptoms are daily.  Pain, numbness and tingling, and headaches are worsened with activity.  He denies any specific clumsiness in his hands.  His symptoms are not specifically worse at nighttime.  Bowel and bladder function have remained normal.  The patient indicates he has mild ongoing numbness and tingling in both feet but no significant back pain currently.\par \par You may recall that the patient has undergone previous imaging studies of the cervical spine.  In July 2018 MRI studies identified significant degenerative spondylosis at C4-5, C5-6, and C6-7 with foraminal compromise but not central.  At each level it was more pronounced on the left-hand side compared to the right and the C5-6 level in particular was the worst.  There was a spondylolisthesis identified at C3-4.  Dynamic x-rays of the cervical spine done in September 2018 showed reversal of the curvature of the neck and at C3-4 with extension of the neck the 4 mm anterolisthesis reduced.  This was not commented on by the neuroradiologist.\par \par EMG nerve conduction studies of the upper limbs performed in May 2018 identified chronic C6 and C7 bilateral radiculopathy as well as bilateral moderate carpal tunnel syndrome.\par \par On examination the patient demonstrates discomfort with range of motion activities of the cervical spine.  In particular extension and rotation maneuvers are painful.  The patient indicates suboccipital muscular discomfort and spasming.  These are consistent with an origin to his headaches which radiate out of the occipital region anteriorly into the temple and forehead.  The patient has intact strength with respect to the deltoids biceps triceps wrist extensors and intrinsics of the hand.  Capillary refill is normal in both hands.  The reflexes are symmetric and normal.  Debbi sign is negative.  The patient indicates numbness and tingling in all components of the hand but objectively to vibration and pinprick he has intact sensation.  Phalen's and Tinel's test are negative today.\par \par The patient has important past history of diagnostic imaging with degenerative spondylotic changes and suggestion of mobility at C3-4 in particular.  In addition there is positive degenerative changes identified on electrophysiology studies.  The patient has been participating in physical therapy but has sustained progressive daily symptoms including headaches.  It has been approximately 18 months or more since the patient had imaging of the cervical spine.  I have provided the patient with a prescription for an updated MRI scan and dynamic x-rays of the cervical spine.  I will see the patient again in my office after these studies are completed in order to review if there has been progressive anatomic degeneration and to discuss the possible role of surgical intervention.  The particular considerations are stabilization of the C3-4 level if there is in fact confirmation or progressive dynamic spondylolisthesis.  In addition decompression or fusion may be appropriate at C5-6 and C6-7 based on the patient's symptom profile and affecting his hands and correlating with the previous positive EMG nerve conduction studies.  The patient indicated good understanding of my descriptions and explanations.  He is in full agreement with the proposed further investigations with a view to possible surgical intervention.\par \par Thank you for very kindly including me in the evaluation and treatment of your patient.  Please do not hesitate to contact me should you have any questions or concerns regarding this evaluation, patient's diagnosis, or the recommendation for additional diagnostic imaging. [FreeTextEntry3] : Josh Cardona MD, PhD, FRCPSC \par Attending Neurosurgeon \par  of Neurosurgery \par [facility] \par [address], [address]nd [address] \[place] [address] \par Office: [phone] \par Fax: [phone]\par \par

## 2020-04-21 ENCOUNTER — TRANSCRIPTION ENCOUNTER (OUTPATIENT)
Age: 60
End: 2020-04-21

## 2020-05-05 ENCOUNTER — RX RENEWAL (OUTPATIENT)
Age: 60
End: 2020-05-05

## 2020-09-07 ENCOUNTER — NON-APPOINTMENT (OUTPATIENT)
Age: 60
End: 2020-09-07

## 2020-09-10 ENCOUNTER — RX RENEWAL (OUTPATIENT)
Age: 60
End: 2020-09-10

## 2020-09-10 ENCOUNTER — TRANSCRIPTION ENCOUNTER (OUTPATIENT)
Age: 60
End: 2020-09-10

## 2020-09-17 RX ORDER — FLECAINIDE ACETATE 50 MG/1
50 TABLET ORAL TWICE DAILY
Qty: 180 | Refills: 1 | Status: DISCONTINUED | COMMUNITY
End: 2020-09-17

## 2020-09-25 RX ORDER — AMIODARONE HYDROCHLORIDE 200 MG/1
200 TABLET ORAL
Qty: 180 | Refills: 3 | Status: DISCONTINUED | COMMUNITY
Start: 2020-09-17 | End: 2020-09-25

## 2020-09-28 ENCOUNTER — NON-APPOINTMENT (OUTPATIENT)
Age: 60
End: 2020-09-28

## 2020-09-28 ENCOUNTER — APPOINTMENT (OUTPATIENT)
Dept: ELECTROPHYSIOLOGY | Facility: CLINIC | Age: 60
End: 2020-09-28
Payer: COMMERCIAL

## 2020-09-28 VITALS
WEIGHT: 278 LBS | HEART RATE: 57 BPM | BODY MASS INDEX: 36.84 KG/M2 | SYSTOLIC BLOOD PRESSURE: 138 MMHG | DIASTOLIC BLOOD PRESSURE: 89 MMHG | OXYGEN SATURATION: 100 % | HEIGHT: 73 IN

## 2020-09-28 DIAGNOSIS — F41.9 ANXIETY DISORDER, UNSPECIFIED: ICD-10-CM

## 2020-09-28 PROCEDURE — 93000 ELECTROCARDIOGRAM COMPLETE: CPT

## 2020-09-28 PROCEDURE — 99214 OFFICE O/P EST MOD 30 MIN: CPT

## 2020-09-28 RX ORDER — RANITIDINE HCL 150 MG
150 CAPSULE ORAL TWICE DAILY
Refills: 0 | Status: DISCONTINUED | COMMUNITY
End: 2020-09-28

## 2020-09-28 RX ORDER — ATENOLOL 25 MG/1
25 TABLET ORAL DAILY
Qty: 30 | Refills: 3 | Status: DISCONTINUED | COMMUNITY
End: 2020-09-28

## 2020-09-28 RX ORDER — METOPROLOL SUCCINATE 25 MG/1
25 TABLET, EXTENDED RELEASE ORAL
Qty: 180 | Refills: 2 | Status: DISCONTINUED | COMMUNITY
Start: 1900-01-01 | End: 2020-09-28

## 2020-09-29 NOTE — HISTORY OF PRESENT ILLNESS
[FreeTextEntry1] : I had the pleasure of seeing your patient Curtis Tatum today in the arrhythmia clinic of [facility]. As you well no the patient is a delightful but anxious 59-year-old gentleman with a history of hypertension, hyperlipidemia, sleep apnea, obesity and atrial fibrillation. Over the years the patient reported vague episodes of chest pain, occasional dizziness and palpitations. He thinks the atrial fibrillation probably started around 2014. He underwent an evaluation at that time and he was under quite a bit of stress. Things settled down and he went for about 2 years without any episodes but then increased again.He was started on low dose atenolol which seemed to reduce his palpitations. The patient had lost weight and in 2014 had come off his medications. He had an acute rise in his blood pressure and what he described as a panic attack with his heart rate going to 140 beats per minute. He went back on atenolol and this terminated the episode acutely. Ultimately he was diagnosed with atrial fibrillation and had been managed with aspirin and beta blocker only. The patient had a stress test and echocardiogram which were unremarkable. He does report he was having increasing episodes of the atrial fibrillation and was on serial increases of the atenolol. He wore a monitor from March 5 through the 13th 2019 which showed a heart rate between 34 and 143 beats per minute with an average of 52. He was in atrial fibrillation 4% of the time with the longest episode being 2 hours and 43 minutes and heart rates ranging from  beats per minute. He also had runs of what appeared to be an atrial tachycardia.  \par \par Echocardiogram February 13, 2019: Normal left ventricular size and function with ejection fraction of 60-65%. Left atrial enlargement of 4.7 cm. No significant valvular disease\par \par A chemical stress test March 26th 2019 normal left ventricular size and function with an ejection fraction of 60% and no evidence of ischemia\par \par When I first saw him in clinic we discussed the treatment options. We discussed trying flecainide versus catheter ablation. The patient opted for medical approach but did not take the medications initially. He'll do that when he started taking them and there was some initial success but he began having breakthroughs. He therefore increased the dose from 100 mg twice a day to 150 mg twice a day but was still having breakthrough episodes. We switched him to amiodarone to try to settle things down until he has the ablation. The patient did not feel that the amiodarone was helping and switched himself back to flecainide.  The patient finally opted for an ablative approach and underwent an atrial fibrillation ablation procedure on July 18, 2019. The patient had a fair amount of early recurrent atrial fibrillation. He continued on flecainide and atenolol and things began to slowly settled down. He was maintained on low dose atenolol and flecainide and his atrial fibrillation had gone away. He did quite well for a number of months without any atrial fibrillation. Unfortunately he recently began having recurrent, brief episodes. He thinks this is partly related to an increase in stress in his life (his wife's illness and a new neighbor). \par \par He returns to clinic today for follow-up. Overall he is doing well. He is having fairly frequent episodes of palpitations with his heart rate ranging for .  He has self adjusted both his metoprolol and flecainide. He currently is taking about 150 BID of flecainide and 100 of metoprolol. His BP today is 138/89 and his pulse is 57 and regular. His ECG demonstrated SR at 58. \par \par

## 2020-09-29 NOTE — DISCUSSION/SUMMARY
[FreeTextEntry1] : In summary the patient is a fairly anxious 59 year old with recurrent paroxysmal atrial fibrillation status post ablation. He did well post ablation for a period of time suggesting it was effective and that he probably has a reconnection.  We discussed a repeat ablation and he will eventually have this done but wants to get through his wife's illness. We discussed trying amiodarone again and he was reluctant. We discussed sotolol and dofetilide but he does not wish to be hospitalized. For now he will try Rhythmol and see how he does. I also offered him the Mindfulness training for AF protocol which he was excited to try.

## 2020-09-29 NOTE — REASON FOR VISIT
[Follow-Up - Clinic] : a clinic follow-up of [Atrial Fibrillation] : atrial fibrillation [FreeTextEntry2] : Atrial Fibrillation

## 2021-01-06 ENCOUNTER — APPOINTMENT (OUTPATIENT)
Dept: OTOLARYNGOLOGY | Facility: CLINIC | Age: 61
End: 2021-01-06

## 2021-03-01 ENCOUNTER — RX RENEWAL (OUTPATIENT)
Age: 61
End: 2021-03-01

## 2021-04-26 ENCOUNTER — RX RENEWAL (OUTPATIENT)
Age: 61
End: 2021-04-26

## 2021-04-29 ENCOUNTER — TRANSCRIPTION ENCOUNTER (OUTPATIENT)
Age: 61
End: 2021-04-29

## 2021-08-19 ENCOUNTER — TRANSCRIPTION ENCOUNTER (OUTPATIENT)
Age: 61
End: 2021-08-19

## 2021-09-20 ENCOUNTER — APPOINTMENT (OUTPATIENT)
Dept: ELECTROPHYSIOLOGY | Facility: CLINIC | Age: 61
End: 2021-09-20

## 2021-10-09 ENCOUNTER — RX RENEWAL (OUTPATIENT)
Age: 61
End: 2021-10-09

## 2021-12-17 ENCOUNTER — RX RENEWAL (OUTPATIENT)
Age: 61
End: 2021-12-17

## 2022-01-03 ENCOUNTER — RX RENEWAL (OUTPATIENT)
Age: 62
End: 2022-01-03

## 2022-03-14 ENCOUNTER — APPOINTMENT (OUTPATIENT)
Dept: NEUROSURGERY | Facility: CLINIC | Age: 62
End: 2022-03-14

## 2022-05-13 ENCOUNTER — NON-APPOINTMENT (OUTPATIENT)
Age: 62
End: 2022-05-13

## 2022-05-14 ENCOUNTER — NON-APPOINTMENT (OUTPATIENT)
Age: 62
End: 2022-05-14

## 2022-06-06 DIAGNOSIS — M25.562 PAIN IN RIGHT KNEE: ICD-10-CM

## 2022-06-06 DIAGNOSIS — M25.561 PAIN IN RIGHT KNEE: ICD-10-CM

## 2022-06-06 DIAGNOSIS — G89.29 PAIN IN RIGHT KNEE: ICD-10-CM

## 2022-06-08 ENCOUNTER — APPOINTMENT (OUTPATIENT)
Dept: ORTHOPEDIC SURGERY | Facility: CLINIC | Age: 62
End: 2022-06-08

## 2022-06-20 NOTE — H&P CARDIOLOGY - BSA (M2)
06/28/22      Dear: Ric Rodriguez      We have attempted to contact you regarding a conversation about your thyroid.  Luis Valencia MD would like for you to call us back at your convenience to discuss further. Please call us back at [phone].    Sincerely,     Luis Valencia MD  [address] [place] [address]  Dept Phone: [phone]             2.47

## 2022-06-23 ENCOUNTER — APPOINTMENT (OUTPATIENT)
Dept: ORTHOPEDIC SURGERY | Facility: CLINIC | Age: 62
End: 2022-06-23
Payer: COMMERCIAL

## 2022-06-23 ENCOUNTER — NON-APPOINTMENT (OUTPATIENT)
Age: 62
End: 2022-06-23

## 2022-06-23 VITALS — WEIGHT: 278 LBS | HEIGHT: 73 IN | BODY MASS INDEX: 36.84 KG/M2

## 2022-06-23 DIAGNOSIS — M17.0 BILATERAL PRIMARY OSTEOARTHRITIS OF KNEE: ICD-10-CM

## 2022-06-23 PROCEDURE — 73564 X-RAY EXAM KNEE 4 OR MORE: CPT | Mod: 50

## 2022-06-23 PROCEDURE — 99204 OFFICE O/P NEW MOD 45 MIN: CPT

## 2022-06-25 NOTE — HISTORY OF PRESENT ILLNESS
[de-identified] : This is very nice 61-year-old male experiencing bilateral knee pain, which is severe in intensity.  The knees hurt equally.  Recently been working on weight loss.  BMI is now 37.  The pain substantially limits activities of daily living. Walking tolerance is reduced. Medication including NSAIDs and injections and activity modification have been minimally effective for a period lasting greater than three months in duration. Assistive devices and external support were not deemed by the patient to be helpful in improving their function. Due to the severity of osteoarthritis and level of pain, physical therapy is contraindicated. Pain and restriction of function are intolerable at this time. The patient denies any radiation of the pain to the feet and it is not associated with numbness, tingling, or weakness.

## 2022-06-25 NOTE — PHYSICAL EXAM
[de-identified] : Patient is well nourished, well-developed, in no acute distress, with appropriate mood and affect. The patient is oriented to time, place, and person. Respirations are even and unlabored. Gait evaluation does reveal a limp. There is no inguinal adenopathy. Bilateral limbs are well-perfused, without skin lesions, shows a grossly normal motor and sensory examination. The right knee motion is significantly reduced and does cause significant pain. The right knee moves from 0 to 125 degrees. The knee is stable within that range-of-motion to AP and ML stress. The alignment of the knee is 5 degrees varus. Muscle strength is normal. Pedal pulses are palpable. Hip examination was negative. The left knee motion is significantly reduced and does cause significant pain. The left knee moves from 0 to 125 degrees. The knee is stable within that range-of-motion to AP and ML stress. The alignment of the knee is 5 degrees varus. Muscle strength is normal. Pedal pulses are palpable. Hip examination was negative. [de-identified] : Long standing knee, AP knee, lateral knee, and patellar views of the right knee were ordered and taken in the office and demonstrate severe degenerative joint disease of the knee with joint space narrowing, osteophyte formation, and subchondral sclerosis.\par \par Long standing knee, AP knee, lateral knee, and patellar views of the left knee were ordered and taken in the office and demonstrate mild degenerative joint disease of the knee with joint space narrowing, osteophyte formation, and subchondral sclerosis.

## 2022-06-25 NOTE — DISCUSSION/SUMMARY
[de-identified] : This patient is severe right knee osteoarthritis and mild left knee osteoarthritis.  He has failed a course of conservative management and would like to proceed with a right total knee arthroplasty using robotic navigation for assistance.\par \par The patient is an appropriate candidate for consideration of right total knee replacement. An extensive discussion was conducted of the natural history of the disease and the variety of surgical and non-surgical treatment options available to the patient. A risk/benefit analysis was discussed with the patient reviewing the advantages and disadvantages of surgical intervention at this time. Both the level and length of the patient's pain have made additional conservative treatment measures consisting of NSAIDs, physical therapy, corticosteroids, and/or viscosupplementation contraindicated. A full explanation was given of the nature and the purpose of the procedure and anesthesia, its benefits, possible alternative methods of diagnosis or treatment, the risks involved, the possibility of complications, the foreseeable consequences of the procedure and the possible results of the non-treatment. I reviewed the plan of care as well as used a model of a total knee implant equivalent to the one that will be used for their total knee joint replacement. The ability to secure the implant utilizing cement or cementless (press-fit) was discussed with the patient. The patient agrees with the plan of care, as well as the use of implants for their total knee replacement.   We also discussed that if robotic/computer navigation is utilized, then additional incisions may need to be made to accommodate the computer navigation arrays, which will be placed in the femur and tibia.\par \par No guarantee or assurance was made as to the results that may be obtained. Specifically, the risks were identified to include, but are not limited to the following: Infection, phlebitis, pulmonary embolism, death, paralysis, dislocation, pain, stiffness, instability, limp, weakness, breakage, leg-length inequality, uncontrolled bleeding, nerve injury, blood vessel injury, pressure sores, anesthetic risks, delayed healing of wound and bone, and wear and loosening. Further discussion was undertaken with the patient about the details of surgical preparation, treatment, and postoperative rehabilitation including medical clearance, autotransfusion, the hospital course, and the postoperative rehabilitation involved. All in all, I feel that this patient is a good candidate for surgical reconstruction.\par \par The patient and I discussed the current SARS-CoV-2 (COVID-19) pandemic which has affected our local hospitals. We discussed that our hospitals treat patients with COVID-19. All efforts will be made to avoid cohorting the patient with diagnosed or suspected COVID-19 patient. They also understand that we will screen them 24-48 hours prior to surgery. Despite our best efforts, there is a potential risk for iatrogenic transmission of COVID-19 to the patient during the perioperative period. Taisha COVID-19 during the perioperative period may increase the patient´s risks of an adverse outcome including postoperative pneumonia, difficulty breathing, requirement for a breathing tube (general endotracheal intubation), and death. The patient is understanding of this risk, and is willing to proceed with surgery at this time.

## 2022-07-08 ENCOUNTER — APPOINTMENT (OUTPATIENT)
Dept: NEUROSURGERY | Facility: CLINIC | Age: 62
End: 2022-07-08

## 2022-08-10 ENCOUNTER — RESULT REVIEW (OUTPATIENT)
Age: 62
End: 2022-08-10

## 2022-08-23 ENCOUNTER — NON-APPOINTMENT (OUTPATIENT)
Age: 62
End: 2022-08-23

## 2022-08-25 ENCOUNTER — NON-APPOINTMENT (OUTPATIENT)
Age: 62
End: 2022-08-25

## 2022-08-25 ENCOUNTER — APPOINTMENT (OUTPATIENT)
Dept: ORTHOPEDIC SURGERY | Facility: CLINIC | Age: 62
End: 2022-08-25

## 2022-08-25 DIAGNOSIS — M17.11 UNILATERAL PRIMARY OSTEOARTHRITIS, RIGHT KNEE: ICD-10-CM

## 2022-08-25 PROCEDURE — 99443: CPT

## 2022-09-02 ENCOUNTER — TRANSCRIPTION ENCOUNTER (OUTPATIENT)
Age: 62
End: 2022-09-02

## 2022-09-16 ENCOUNTER — APPOINTMENT (OUTPATIENT)
Dept: ORTHOPEDIC SURGERY | Facility: CLINIC | Age: 62
End: 2022-09-16

## 2023-02-08 ENCOUNTER — APPOINTMENT (OUTPATIENT)
Dept: CARDIOTHORACIC SURGERY | Facility: CLINIC | Age: 63
End: 2023-02-08

## 2023-03-06 ENCOUNTER — NON-APPOINTMENT (OUTPATIENT)
Age: 63
End: 2023-03-06

## 2023-03-06 ENCOUNTER — APPOINTMENT (OUTPATIENT)
Dept: ELECTROPHYSIOLOGY | Facility: CLINIC | Age: 63
End: 2023-03-06
Payer: COMMERCIAL

## 2023-03-06 VITALS
SYSTOLIC BLOOD PRESSURE: 116 MMHG | RESPIRATION RATE: 14 BRPM | WEIGHT: 257 LBS | OXYGEN SATURATION: 98 % | BODY MASS INDEX: 34.06 KG/M2 | HEIGHT: 73 IN | DIASTOLIC BLOOD PRESSURE: 73 MMHG | HEART RATE: 56 BPM

## 2023-03-06 PROCEDURE — 93000 ELECTROCARDIOGRAM COMPLETE: CPT

## 2023-03-06 PROCEDURE — 99213 OFFICE O/P EST LOW 20 MIN: CPT | Mod: 25

## 2023-03-06 RX ORDER — PAROXETINE HYDROCHLORIDE 10 MG/1
10 TABLET, FILM COATED ORAL
Qty: 60 | Refills: 0 | Status: DISCONTINUED | COMMUNITY
Start: 2023-02-13

## 2023-03-06 RX ORDER — TOBRAMYCIN AND DEXAMETHASONE 3; 1 MG/ML; MG/ML
0.3-0.1 SUSPENSION/ DROPS OPHTHALMIC
Qty: 5 | Refills: 0 | Status: DISCONTINUED | COMMUNITY
Start: 2023-01-03

## 2023-03-06 RX ORDER — VALACYCLOVIR 1 G/1
1 TABLET, FILM COATED ORAL
Qty: 21 | Refills: 0 | Status: DISCONTINUED | COMMUNITY
Start: 2022-11-01

## 2023-03-06 RX ORDER — CLONAZEPAM 0.25 MG/1
0.25 TABLET, ORALLY DISINTEGRATING ORAL
Qty: 180 | Refills: 0 | Status: DISCONTINUED | COMMUNITY
Start: 2023-01-25

## 2023-03-06 RX ORDER — PREGABALIN 50 MG/1
50 CAPSULE ORAL
Qty: 90 | Refills: 0 | Status: DISCONTINUED | COMMUNITY
Start: 2023-02-15

## 2023-03-06 RX ORDER — NEOMYCIN SULFATE, POLYMYXIN B SULFATE AND DEXAMETHASONE 3.5; 10000; 1 MG/ML; [USP'U]/ML; MG/ML
3.5-10000-0.1 SUSPENSION OPHTHALMIC
Qty: 5 | Refills: 0 | Status: DISCONTINUED | COMMUNITY
Start: 2023-01-03

## 2023-03-06 RX ORDER — ECONAZOLE NITRATE 10 MG/G
1 CREAM TOPICAL
Qty: 85 | Refills: 0 | Status: ACTIVE | COMMUNITY
Start: 2022-12-20

## 2023-03-06 RX ORDER — TOBRAMYCIN 3 MG/ML
0.3 SOLUTION/ DROPS OPHTHALMIC
Qty: 5 | Refills: 0 | Status: DISCONTINUED | COMMUNITY
Start: 2023-01-17

## 2023-03-06 RX ORDER — PAROXETINE 12.5 MG/1
12.5 TABLET, FILM COATED, EXTENDED RELEASE ORAL
Qty: 90 | Refills: 0 | Status: DISCONTINUED | COMMUNITY
Start: 2022-10-20

## 2023-03-06 RX ORDER — GABAPENTIN 400 MG/1
400 CAPSULE ORAL 3 TIMES DAILY
Refills: 0 | Status: ACTIVE | COMMUNITY
Start: 2023-01-18

## 2023-03-06 RX ORDER — CEPHALEXIN 500 MG/1
500 CAPSULE ORAL
Qty: 28 | Refills: 0 | Status: DISCONTINUED | COMMUNITY
Start: 2023-01-04

## 2023-03-06 RX ORDER — PROPAFENONE 325 MG/1
325 CAPSULE, EXTENDED RELEASE ORAL
Qty: 180 | Refills: 0 | Status: DISCONTINUED | COMMUNITY
Start: 2020-09-28 | End: 2023-03-06

## 2023-03-06 RX ORDER — HYDROCORTISONE 2.5% 25 MG/G
2.5 CREAM TOPICAL
Qty: 150 | Refills: 0 | Status: ACTIVE | COMMUNITY
Start: 2023-01-05

## 2023-03-06 RX ORDER — GABAPENTIN 100 MG/1
100 CAPSULE ORAL
Refills: 0 | Status: DISCONTINUED | COMMUNITY
Start: 2018-10-30 | End: 2023-03-06

## 2023-03-06 RX ORDER — CLONAZEPAM 0.5 MG/1
0.5 TABLET, ORALLY DISINTEGRATING ORAL
Qty: 90 | Refills: 0 | Status: DISCONTINUED | COMMUNITY
Start: 2023-02-15

## 2023-03-06 RX ORDER — METOPROLOL SUCCINATE 25 MG/1
25 TABLET, EXTENDED RELEASE ORAL
Qty: 90 | Refills: 1 | Status: DISCONTINUED | COMMUNITY
Start: 2021-10-09 | End: 2023-03-06

## 2023-03-06 RX ORDER — AMMONIUM LACTATE 12 %
12 CREAM (GRAM) TOPICAL
Qty: 140 | Refills: 0 | Status: ACTIVE | COMMUNITY
Start: 2022-12-20

## 2023-03-06 RX ORDER — MUPIROCIN 20 MG/G
2 OINTMENT TOPICAL
Qty: 22 | Refills: 0 | Status: DISCONTINUED | COMMUNITY
Start: 2022-12-20

## 2023-03-06 RX ORDER — FLUOROMETHOLONE ACETATE 1 MG/ML
0.1 SUSPENSION/ DROPS OPHTHALMIC
Qty: 40 | Refills: 0 | Status: DISCONTINUED | COMMUNITY
Start: 2022-06-02

## 2023-03-06 RX ORDER — CLONAZEPAM 0.12 MG/1
0.12 TABLET, ORALLY DISINTEGRATING ORAL
Qty: 180 | Refills: 0 | Status: ACTIVE | COMMUNITY
Start: 2022-10-04

## 2023-03-08 NOTE — DISCUSSION/SUMMARY
[FreeTextEntry1] : In summary the patient is a fairly anxious 62 year old with recurrent paroxysmal atrial fibrillation status post ablation. He did well post ablation for a period of time suggesting it was effective and that he probably has a reconnection.  He was started on Flecainide with Toprol XL and has been doing well from a rhythm standpoint with a last symptomatic episode of atrial fibrillation 9 months ago.  However, he had anemia and GI bleeding from hyperplastic polyps and has been off of Eliquis since December 2022. Given his CHADSVASC of 1, we will monitor him off of AC at this time.  If his AF were to recur in a more frequent nature, we discussed the need for likely repeat ablation. The patient will be checking his pulse twice per day and we discussed getting an [employer] watch or [employer] device. If the episodes increase he is amenable to a repeat ablation.  He is in agreement with all of the above.  All questions were answered to his apparent satisfaction.\par \par I, Dr. Storm, personally performed the evaluation and management (E/M) services for this established patient who presents today with existing conditions. That E/M includes conducting the examination, assessing all existing conditions, and establishing a new plan of care. Today, my fellow, Marc Cornelius MD, was here to observe my evaluation and management services for his existing condition to be followed going forward.  [EKG obtained to assist in diagnosis and management of assessed problem(s)] : EKG obtained to assist in diagnosis and management of assessed problem(s)

## 2023-03-08 NOTE — HISTORY OF PRESENT ILLNESS
[FreeTextEntry1] : I had the pleasure of seeing your patient Curtis Tatum today in the arrhythmia clinic of [facility]. As you well no the patient is a delightful but anxious 62-year-old gentleman with a history of hypertension, hyperlipidemia, sleep apnea, obesity and atrial fibrillation. Over the years the patient reported vague episodes of chest pain, occasional dizziness and palpitations. He thinks the atrial fibrillation probably started around 2014. He eventually was started on low dose atenolol which seemed to reduce his palpitations.  Ultimately he was diagnosed with atrial fibrillation and had been managed with aspirin and beta blocker only. The patient had a stress test and echocardiogram which were unremarkable. He had increasing episodes of the atrial fibrillation and was on serial increases of the atenolol. He wore a monitor from March 5 through the 13th 2019 which showed a heart rate between 34 and 143 beats per minute with an average of 52. He was in atrial fibrillation 4% of the time with the longest episode being 2 hours and 43 minutes and heart rates ranging from  beats per minute. He also had runs of what appeared to be an atrial tachycardia.  \par \par Echocardiogram February 13, 2019: Normal left ventricular size and function with ejection fraction of 60-65%. Left atrial enlargement of 4.7 cm. No significant valvular disease\par \par A chemical stress test March 26th 2019 normal left ventricular size and function with an ejection fraction of 60% and no evidence of ischemia\par \par When I first saw him in clinic we discussed the treatment options. The patient opted for medical approach but did not take the medications initially. He'll do that when he started taking them and there was some initial success but he began having breakthroughs. He therefore increased the dose from 100 mg twice a day to 150 mg twice a day but was still having breakthrough episodes. We switched him to amiodarone to try to settle things down until he has the ablation. The patient did not feel that the amiodarone was helping and switched himself back to flecainide.  The patient finally opted for an ablative approach and underwent an atrial fibrillation ablation procedure on July 18, 2019. The patient had a fair amount of early recurrent atrial fibrillation. He continued on flecainide and atenolol and things began to slowly settled down. He was maintained on low dose atenolol and flecainide and his atrial fibrillation had gone away.  \par \par Since then, he was diagnosed with bleeding hyperplastic polyps with ulcerations in August 2022 thought due to H2 blocker therapy.  He was started on Iron therapy and in December 2022 underwent polyp removal.  He has been off of Eliquis since then.\par \par Today in clinic he feels well.  His states his last episode of atrial fibrillation was June of 2022 after an intense argument with his son.  He reports a rare episodes of palpitations for 5-10 seconds that do not degenerate into atrial fibrillation.  He is maintained on Flecainide 75mg bid and Toprol XL 25mg BID.  His BP today is 116/73 and pulse is 56 and regular.  his ECG demonstrated SR at 56 bpm.\par \par

## 2023-03-08 NOTE — DISCUSSION/SUMMARY
[FreeTextEntry1] : In summary the patient is a fairly anxious 62 year old with recurrent paroxysmal atrial fibrillation status post ablation. He did well post ablation for a period of time suggesting it was effective and that he probably has a reconnection.  He was started on Flecainide with Toprol XL and has been doing well from a rhythm standpoint with a last symptomatic episode of atrial fibrillation 9 months ago.  However, he had anemia and GI bleeding from hyperplastic polyps and has been off of Eliquis since December 2022. Given his CHADSVASC of 1, we will monitor him off of AC at this time.  If his AF were to recur in a more frequent nature, we discussed the need for likely repeat ablation. The patient will be checking his pulse twice per day and we discussed getting an [employer] watch or [employer] device. If the episodes increase he is amenable to a repeat ablation.  He is in agreement with all of the above.  All questions were answered to his apparent satisfaction.\par \par I, Dr. Storm, personally performed the evaluation and management (E/M) services for this established patient who presents today with existing conditions. That E/M includes conducting the examination, assessing all existing conditions, and establishing a new plan of care. Today, my fellow, Marc Cornelisu MD, was here to observe my evaluation and management services for his existing condition to be followed going forward.  [EKG obtained to assist in diagnosis and management of assessed problem(s)] : EKG obtained to assist in diagnosis and management of assessed problem(s)

## 2023-03-08 NOTE — HISTORY OF PRESENT ILLNESS
[FreeTextEntry1] : I had the pleasure of seeing your patient Curtis Tatum today in the arrhythmia clinic of [facility]. As you well no the patient is a delightful but anxious 62-year-old gentleman with a history of hypertension, hyperlipidemia, sleep apnea, obesity and atrial fibrillation. Over the years the patient reported vague episodes of chest pain, occasional dizziness and palpitations. He thinks the atrial fibrillation probably started around 2014. He eventually was started on low dose atenolol which seemed to reduce his palpitations.  Ultimately he was diagnosed with atrial fibrillation and had been managed with aspirin and beta blocker only. The patient had a stress test and echocardiogram which were unremarkable. He had increasing episodes of the atrial fibrillation and was on serial increases of the atenolol. He wore a monitor from March 5 through the 13th 2019 which showed a heart rate between 34 and 143 beats per minute with an average of 52. He was in atrial fibrillation 4% of the time with the longest episode being 2 hours and 43 minutes and heart rates ranging from  beats per minute. He also had runs of what appeared to be an atrial tachycardia.  \par \par Echocardiogram February 13, 2019: Normal left ventricular size and function with ejection fraction of 60-65%. Left atrial enlargement of 4.7 cm. No significant valvular disease\par \par A chemical stress test March 26th 2019 normal left ventricular size and function with an ejection fraction of 60% and no evidence of ischemia\par \par When I first saw him in clinic we discussed the treatment options. The patient opted for medical approach but did not take the medications initially. He'll do that when he started taking them and there was some initial success but he began having breakthroughs. He therefore increased the dose from 100 mg twice a day to 150 mg twice a day but was still having breakthrough episodes. We switched him to amiodarone to try to settle things down until he has the ablation. The patient did not feel that the amiodarone was helping and switched himself back to flecainide.  The patient finally opted for an ablative approach and underwent an atrial fibrillation ablation procedure on July 18, 2019. The patient had a fair amount of early recurrent atrial fibrillation. He continued on flecainide and atenolol and things began to slowly settled down. He was maintained on low dose atenolol and flecainide and his atrial fibrillation had gone away.  \par \par Since then, he was diagnosed with bleeding hyperplastic polyps with ulcerations in August 2022 thought due to H2 blocker therapy.  He was started on Iron therapy and in December 2022 underwent polyp removal.  He has been off of Eliquis since then.\par \par Today in clinic he feels well.  His states his last episode of atrial fibrillation was June of 2022 after an intense argument with his son.  He reports a rare episodes of palpitations for 5-10 seconds that do not degenerate into atrial fibrillation.  He is maintained on Flecainide 75mg bid and Toprol XL 25mg BID.  His BP today is 116/73 and pulse is 56 and regular.  his ECG demonstrated SR at 56 bpm.\par \par

## 2023-05-01 NOTE — H&P CARDIOLOGY - HEIGHT IN CM
185.42
PAST MEDICAL HISTORY:  Anxiety and depression     Chronic GERD     Gastroparesis     Giant cell tumor     H/O left wrist surgery     H/O right knee surgery     History of arthroscopy of right shoulder     Migraines     Obstructive sleep apnea     Umbilical hernia

## 2023-09-06 ENCOUNTER — APPOINTMENT (OUTPATIENT)
Dept: NEUROSURGERY | Facility: CLINIC | Age: 63
End: 2023-09-06
Payer: COMMERCIAL

## 2023-09-06 DIAGNOSIS — G56.03 CARPAL TUNNEL SYNDROM,BILATERAL UPPER LIMBS: ICD-10-CM

## 2023-09-06 DIAGNOSIS — Z78.9 OTHER SPECIFIED HEALTH STATUS: ICD-10-CM

## 2023-09-06 PROCEDURE — 99214 OFFICE O/P EST MOD 30 MIN: CPT

## 2023-09-07 ENCOUNTER — NON-APPOINTMENT (OUTPATIENT)
Age: 63
End: 2023-09-07

## 2023-09-07 VITALS
SYSTOLIC BLOOD PRESSURE: 104 MMHG | HEART RATE: 55 BPM | WEIGHT: 255 LBS | DIASTOLIC BLOOD PRESSURE: 76 MMHG | BODY MASS INDEX: 34.54 KG/M2 | HEIGHT: 72 IN | OXYGEN SATURATION: 94 %

## 2023-09-07 NOTE — REVIEW OF SYSTEMS
[Poor Coordination] : poor coordination [Numbness] : numbness [Tingling] : tingling [Difficulty Walking] : difficulty walking [Negative] : Heme/Lymph [Hand Weakness] : no hand weakness [Leg Weakness] : no leg weakness [de-identified] : neck pain  back pain

## 2023-09-07 NOTE — DATA REVIEWED
[de-identified] : 5/11/2023  MRI of cervical spine from [facility] ,   MRI of lumbar spine from [facility] 8/4/2022

## 2023-09-07 NOTE — CONSULT LETTER
[Courtesy Letter:] : I had the pleasure of seeing your patient, [unfilled], in my office today. [Sincerely,] : Sincerely, [FreeTextEntry2] : Carrillo Curran  [address]  [address] [place]  [address] [FreeTextEntry3] : Josh Cardona MD, PhD, FRCPSC  Attending Neurosurgeon   of Neurosurgery  [facility]  [address], [address]nd [place] [address]  Office: [phone]  Fax: [phone]

## 2023-09-13 ENCOUNTER — APPOINTMENT (OUTPATIENT)
Dept: PHYSICAL MEDICINE AND REHAB | Facility: CLINIC | Age: 63
End: 2023-09-13

## 2023-09-18 ENCOUNTER — APPOINTMENT (OUTPATIENT)
Dept: ELECTROPHYSIOLOGY | Facility: CLINIC | Age: 63
End: 2023-09-18
Payer: COMMERCIAL

## 2023-09-18 VITALS
HEART RATE: 50 BPM | DIASTOLIC BLOOD PRESSURE: 77 MMHG | SYSTOLIC BLOOD PRESSURE: 120 MMHG | OXYGEN SATURATION: 99 % | WEIGHT: 253 LBS | HEIGHT: 72 IN | BODY MASS INDEX: 34.27 KG/M2

## 2023-09-18 DIAGNOSIS — I48.0 PAROXYSMAL ATRIAL FIBRILLATION: ICD-10-CM

## 2023-09-18 PROCEDURE — 99214 OFFICE O/P EST MOD 30 MIN: CPT | Mod: 25

## 2023-09-18 PROCEDURE — 93000 ELECTROCARDIOGRAM COMPLETE: CPT

## 2023-09-18 RX ORDER — FLECAINIDE ACETATE 100 MG/1
100 TABLET ORAL
Qty: 180 | Refills: 2 | Status: ACTIVE | COMMUNITY
Start: 2019-11-21 | End: 1900-01-01

## 2023-09-24 PROBLEM — G56.03 BILATERAL CARPAL TUNNEL SYNDROME: Status: ACTIVE | Noted: 2020-03-19

## 2023-09-27 ENCOUNTER — APPOINTMENT (OUTPATIENT)
Dept: PHYSICAL MEDICINE AND REHAB | Facility: CLINIC | Age: 63
End: 2023-09-27
Payer: COMMERCIAL

## 2023-09-27 ENCOUNTER — NON-APPOINTMENT (OUTPATIENT)
Age: 63
End: 2023-09-27

## 2023-09-27 PROCEDURE — 99204 OFFICE O/P NEW MOD 45 MIN: CPT

## 2023-09-27 RX ORDER — APIXABAN 5 MG/1
5 TABLET, FILM COATED ORAL
Qty: 180 | Refills: 3 | Status: DISCONTINUED | COMMUNITY
Start: 2019-03-15 | End: 2023-09-27

## 2023-10-19 ENCOUNTER — OUTPATIENT (OUTPATIENT)
Dept: OUTPATIENT SERVICES | Facility: HOSPITAL | Age: 63
LOS: 1 days | End: 2023-10-19
Payer: COMMERCIAL

## 2023-10-19 ENCOUNTER — APPOINTMENT (OUTPATIENT)
Dept: PHYSICAL MEDICINE AND REHAB | Facility: CLINIC | Age: 63
End: 2023-10-19
Payer: COMMERCIAL

## 2023-10-19 DIAGNOSIS — M54.12 RADICULOPATHY, CERVICAL REGION: ICD-10-CM

## 2023-10-19 PROCEDURE — 62321 NJX INTERLAMINAR CRV/THRC: CPT

## 2023-11-01 ENCOUNTER — APPOINTMENT (OUTPATIENT)
Dept: PHYSICAL MEDICINE AND REHAB | Facility: CLINIC | Age: 63
End: 2023-11-01
Payer: COMMERCIAL

## 2023-11-01 DIAGNOSIS — M50.30 OTHER CERVICAL DISC DEGENERATION, UNSPECIFIED CERVICAL REGION: ICD-10-CM

## 2023-11-01 DIAGNOSIS — M47.812 SPONDYLOSIS W/OUT MYELOPATHY OR RADICULOPATHY, CERVICAL REGION: ICD-10-CM

## 2023-11-01 DIAGNOSIS — M54.2 CERVICALGIA: ICD-10-CM

## 2023-11-01 DIAGNOSIS — M54.12 RADICULOPATHY, CERVICAL REGION: ICD-10-CM

## 2023-11-01 PROCEDURE — 99214 OFFICE O/P EST MOD 30 MIN: CPT

## 2023-11-06 ENCOUNTER — APPOINTMENT (OUTPATIENT)
Dept: PHYSICAL MEDICINE AND REHAB | Facility: CLINIC | Age: 63
End: 2023-11-06

## 2023-11-06 DIAGNOSIS — M47.816 SPONDYLOSIS W/OUT MYELOPATHY OR RADICULOPATHY, LUMBAR REGION: ICD-10-CM

## 2023-11-06 DIAGNOSIS — M54.16 RADICULOPATHY, LUMBAR REGION: ICD-10-CM

## 2023-11-06 DIAGNOSIS — M51.36 OTHER INTERVERTEBRAL DISC DEGENERATION, LUMBAR REGION: ICD-10-CM

## 2023-11-17 ENCOUNTER — NON-APPOINTMENT (OUTPATIENT)
Age: 63
End: 2023-11-17

## 2024-01-18 RX ORDER — METOPROLOL SUCCINATE 25 MG/1
25 TABLET, EXTENDED RELEASE ORAL
Qty: 90 | Refills: 1 | Status: ACTIVE | COMMUNITY

## 2024-03-18 ENCOUNTER — APPOINTMENT (OUTPATIENT)
Dept: ELECTROPHYSIOLOGY | Facility: CLINIC | Age: 64
End: 2024-03-18

## 2024-10-21 ENCOUNTER — NON-APPOINTMENT (OUTPATIENT)
Age: 64
End: 2024-10-21

## 2024-10-21 ENCOUNTER — APPOINTMENT (OUTPATIENT)
Dept: ELECTROPHYSIOLOGY | Facility: CLINIC | Age: 64
End: 2024-10-21
Payer: COMMERCIAL

## 2024-10-21 VITALS
HEART RATE: 58 BPM | WEIGHT: 235 LBS | OXYGEN SATURATION: 9 % | RESPIRATION RATE: 14 BRPM | SYSTOLIC BLOOD PRESSURE: 112 MMHG | BODY MASS INDEX: 31.83 KG/M2 | HEIGHT: 72 IN | DIASTOLIC BLOOD PRESSURE: 72 MMHG

## 2024-10-21 DIAGNOSIS — I48.0 PAROXYSMAL ATRIAL FIBRILLATION: ICD-10-CM

## 2024-10-21 PROCEDURE — 93000 ELECTROCARDIOGRAM COMPLETE: CPT

## 2024-10-21 PROCEDURE — 99214 OFFICE O/P EST MOD 30 MIN: CPT | Mod: 25

## 2025-09-12 ENCOUNTER — APPOINTMENT (OUTPATIENT)
Dept: CARDIOTHORACIC SURGERY | Facility: CLINIC | Age: 65
End: 2025-09-12

## 2025-09-12 DIAGNOSIS — M47.816 SPONDYLOSIS W/OUT MYELOPATHY OR RADICULOPATHY, LUMBAR REGION: ICD-10-CM

## 2025-09-15 ENCOUNTER — APPOINTMENT (OUTPATIENT)
Dept: CARDIOTHORACIC SURGERY | Facility: HOSPITAL | Age: 65
End: 2025-09-15
Payer: COMMERCIAL

## 2025-09-15 VITALS
TEMPERATURE: 98.2 F | SYSTOLIC BLOOD PRESSURE: 111 MMHG | BODY MASS INDEX: 31.83 KG/M2 | RESPIRATION RATE: 14 BRPM | DIASTOLIC BLOOD PRESSURE: 77 MMHG | OXYGEN SATURATION: 96 % | HEIGHT: 72 IN | WEIGHT: 235 LBS | HEART RATE: 58 BPM

## 2025-09-15 DIAGNOSIS — K58.9 IRRITABLE BOWEL SYNDROME, UNSPECIFIED: ICD-10-CM

## 2025-09-15 DIAGNOSIS — I48.0 PAROXYSMAL ATRIAL FIBRILLATION: ICD-10-CM

## 2025-09-15 DIAGNOSIS — E78.00 PURE HYPERCHOLESTEROLEMIA, UNSPECIFIED: ICD-10-CM

## 2025-09-15 DIAGNOSIS — K21.9 GASTRO-ESOPHAGEAL REFLUX DISEASE W/OUT ESOPHAGITIS: ICD-10-CM

## 2025-09-15 PROCEDURE — 99203 OFFICE O/P NEW LOW 30 MIN: CPT
